# Patient Record
Sex: FEMALE | Race: WHITE | NOT HISPANIC OR LATINO | Employment: UNEMPLOYED | URBAN - METROPOLITAN AREA
[De-identification: names, ages, dates, MRNs, and addresses within clinical notes are randomized per-mention and may not be internally consistent; named-entity substitution may affect disease eponyms.]

---

## 2017-06-01 ENCOUNTER — OFFICE VISIT (OUTPATIENT)
Dept: RETAIL CLINIC | Facility: CLINIC | Age: 27
End: 2017-06-01

## 2017-06-01 DIAGNOSIS — Z13.9 SCREENING: Primary | ICD-10-CM

## 2020-02-22 ENCOUNTER — HOSPITAL ENCOUNTER (EMERGENCY)
Facility: HOSPITAL | Age: 30
Discharge: HOME | End: 2020-02-22
Attending: EMERGENCY MEDICINE | Admitting: EMERGENCY MEDICINE

## 2020-02-22 ENCOUNTER — APPOINTMENT (OUTPATIENT)
Dept: CT IMAGING | Facility: HOSPITAL | Age: 30
End: 2020-02-22

## 2020-02-22 VITALS
TEMPERATURE: 97.9 F | RESPIRATION RATE: 17 BRPM | WEIGHT: 151 LBS | HEIGHT: 68 IN | OXYGEN SATURATION: 99 % | BODY MASS INDEX: 22.88 KG/M2 | HEART RATE: 93 BPM | SYSTOLIC BLOOD PRESSURE: 113 MMHG | DIASTOLIC BLOOD PRESSURE: 72 MMHG

## 2020-02-22 DIAGNOSIS — S06.0X0A CONCUSSION WITHOUT LOSS OF CONSCIOUSNESS, INITIAL ENCOUNTER: Primary | ICD-10-CM

## 2020-02-22 DIAGNOSIS — G25.3 MYOCLONIC JERKING: ICD-10-CM

## 2020-02-22 LAB
ALBUMIN SERPL-MCNC: 4.5 G/DL (ref 3.5–5.2)
ALBUMIN/GLOB SERPL: 1.7 G/DL
ALP SERPL-CCNC: 50 U/L (ref 39–117)
ALT SERPL W P-5'-P-CCNC: 9 U/L (ref 1–33)
AMORPH URATE CRY URNS QL MICRO: ABNORMAL /HPF
ANION GAP SERPL CALCULATED.3IONS-SCNC: 9 MMOL/L (ref 5–15)
AST SERPL-CCNC: 10 U/L (ref 1–32)
B-HCG UR QL: NEGATIVE
BACTERIA UR QL AUTO: ABNORMAL /HPF
BASOPHILS # BLD AUTO: 0.05 10*3/MM3 (ref 0–0.2)
BASOPHILS NFR BLD AUTO: 0.5 % (ref 0–1.5)
BILIRUB SERPL-MCNC: 0.5 MG/DL (ref 0.2–1.2)
BILIRUB UR QL STRIP: NEGATIVE
BUN BLD-MCNC: 14 MG/DL (ref 6–20)
BUN/CREAT SERPL: 24.1 (ref 7–25)
CA-I SERPL ISE-MCNC: 1.27 MMOL/L (ref 1.12–1.32)
CALCIUM SPEC-SCNC: 9 MG/DL (ref 8.6–10.5)
CHLORIDE SERPL-SCNC: 102 MMOL/L (ref 98–107)
CK SERPL-CCNC: 52 U/L (ref 20–180)
CLARITY UR: ABNORMAL
CO2 SERPL-SCNC: 27 MMOL/L (ref 22–29)
COD CRY URNS QL: ABNORMAL /HPF
COLOR UR: YELLOW
CREAT BLD-MCNC: 0.58 MG/DL (ref 0.57–1)
DEPRECATED RDW RBC AUTO: 40.6 FL (ref 37–54)
EOSINOPHIL # BLD AUTO: 0.07 10*3/MM3 (ref 0–0.4)
EOSINOPHIL NFR BLD AUTO: 0.7 % (ref 0.3–6.2)
ERYTHROCYTE [DISTWIDTH] IN BLOOD BY AUTOMATED COUNT: 11.9 % (ref 12.3–15.4)
ERYTHROCYTE [SEDIMENTATION RATE] IN BLOOD: 5 MM/HR (ref 0–20)
GFR SERPL CREATININE-BSD FRML MDRD: 122 ML/MIN/1.73
GFR SERPL CREATININE-BSD FRML MDRD: 148 ML/MIN/1.73
GLOBULIN UR ELPH-MCNC: 2.6 GM/DL
GLUCOSE BLD-MCNC: 94 MG/DL (ref 65–99)
GLUCOSE BLDC GLUCOMTR-MCNC: 89 MG/DL (ref 70–130)
GLUCOSE UR STRIP-MCNC: NEGATIVE MG/DL
HCT VFR BLD AUTO: 43.3 % (ref 34–46.6)
HGB BLD-MCNC: 14.8 G/DL (ref 12–15.9)
HGB UR QL STRIP.AUTO: NEGATIVE
HOLD SPECIMEN: NORMAL
HOLD SPECIMEN: NORMAL
HYALINE CASTS UR QL AUTO: ABNORMAL /LPF
IMM GRANULOCYTES # BLD AUTO: 0.03 10*3/MM3 (ref 0–0.05)
IMM GRANULOCYTES NFR BLD AUTO: 0.3 % (ref 0–0.5)
INTERNAL NEGATIVE CONTROL: NEGATIVE
INTERNAL POSITIVE CONTROL: POSITIVE
KETONES UR QL STRIP: ABNORMAL
LEUKOCYTE ESTERASE UR QL STRIP.AUTO: NEGATIVE
LYMPHOCYTES # BLD AUTO: 3.24 10*3/MM3 (ref 0.7–3.1)
LYMPHOCYTES NFR BLD AUTO: 32.4 % (ref 19.6–45.3)
Lab: NORMAL
MAGNESIUM SERPL-MCNC: 2 MG/DL (ref 1.6–2.6)
MCH RBC QN AUTO: 31.9 PG (ref 26.6–33)
MCHC RBC AUTO-ENTMCNC: 34.2 G/DL (ref 31.5–35.7)
MCV RBC AUTO: 93.3 FL (ref 79–97)
MONOCYTES # BLD AUTO: 0.74 10*3/MM3 (ref 0.1–0.9)
MONOCYTES NFR BLD AUTO: 7.4 % (ref 5–12)
MUCOUS THREADS URNS QL MICRO: ABNORMAL /HPF
MYOGLOBIN SERPL-MCNC: 21 NG/ML (ref 25–58)
NEUTROPHILS # BLD AUTO: 5.86 10*3/MM3 (ref 1.7–7)
NEUTROPHILS NFR BLD AUTO: 58.7 % (ref 42.7–76)
NITRITE UR QL STRIP: NEGATIVE
NRBC BLD AUTO-RTO: 0 /100 WBC (ref 0–0.2)
PH UR STRIP.AUTO: 7.5 [PH] (ref 5–8)
PLATELET # BLD AUTO: 242 10*3/MM3 (ref 140–450)
PMV BLD AUTO: 10.3 FL (ref 6–12)
POTASSIUM BLD-SCNC: 3.7 MMOL/L (ref 3.5–5.2)
PROT SERPL-MCNC: 7.1 G/DL (ref 6–8.5)
PROT UR QL STRIP: NEGATIVE
RBC # BLD AUTO: 4.64 10*6/MM3 (ref 3.77–5.28)
RBC # UR: ABNORMAL /HPF
REF LAB TEST METHOD: ABNORMAL
SODIUM BLD-SCNC: 138 MMOL/L (ref 136–145)
SP GR UR STRIP: 1.03 (ref 1–1.03)
SQUAMOUS #/AREA URNS HPF: ABNORMAL /HPF
T4 FREE SERPL-MCNC: 1.43 NG/DL (ref 0.93–1.7)
TSH SERPL DL<=0.05 MIU/L-ACNC: 1.01 UIU/ML (ref 0.27–4.2)
UROBILINOGEN UR QL STRIP: ABNORMAL
WBC NRBC COR # BLD: 9.99 10*3/MM3 (ref 3.4–10.8)
WBC UR QL AUTO: ABNORMAL /HPF
WHOLE BLOOD HOLD SPECIMEN: NORMAL
WHOLE BLOOD HOLD SPECIMEN: NORMAL

## 2020-02-22 PROCEDURE — 81001 URINALYSIS AUTO W/SCOPE: CPT | Performed by: EMERGENCY MEDICINE

## 2020-02-22 PROCEDURE — 82962 GLUCOSE BLOOD TEST: CPT

## 2020-02-22 PROCEDURE — 99283 EMERGENCY DEPT VISIT LOW MDM: CPT

## 2020-02-22 PROCEDURE — 80050 GENERAL HEALTH PANEL: CPT | Performed by: EMERGENCY MEDICINE

## 2020-02-22 PROCEDURE — 84439 ASSAY OF FREE THYROXINE: CPT | Performed by: EMERGENCY MEDICINE

## 2020-02-22 PROCEDURE — 83735 ASSAY OF MAGNESIUM: CPT | Performed by: EMERGENCY MEDICINE

## 2020-02-22 PROCEDURE — 81025 URINE PREGNANCY TEST: CPT | Performed by: EMERGENCY MEDICINE

## 2020-02-22 PROCEDURE — 82330 ASSAY OF CALCIUM: CPT | Performed by: EMERGENCY MEDICINE

## 2020-02-22 PROCEDURE — 93005 ELECTROCARDIOGRAM TRACING: CPT | Performed by: EMERGENCY MEDICINE

## 2020-02-22 PROCEDURE — 70450 CT HEAD/BRAIN W/O DYE: CPT

## 2020-02-22 PROCEDURE — 83874 ASSAY OF MYOGLOBIN: CPT | Performed by: EMERGENCY MEDICINE

## 2020-02-22 PROCEDURE — 85652 RBC SED RATE AUTOMATED: CPT | Performed by: EMERGENCY MEDICINE

## 2020-02-22 PROCEDURE — 82550 ASSAY OF CK (CPK): CPT | Performed by: EMERGENCY MEDICINE

## 2020-02-22 RX ORDER — DICLOFENAC SODIUM 75 MG/1
75 TABLET, DELAYED RELEASE ORAL 2 TIMES DAILY
Qty: 14 TABLET | Refills: 0 | OUTPATIENT
Start: 2020-02-22 | End: 2020-03-30

## 2020-02-22 RX ORDER — SODIUM CHLORIDE 0.9 % (FLUSH) 0.9 %
10 SYRINGE (ML) INJECTION AS NEEDED
Status: DISCONTINUED | OUTPATIENT
Start: 2020-02-22 | End: 2020-02-22 | Stop reason: HOSPADM

## 2020-02-22 NOTE — ED PROVIDER NOTES
Subjective   Cooper Bah is a 29 y.o. female who presents to the ED with c/o fall. The patient reports that for the past 2-3 weeks she has been experiencing weakness in her extremities, twitching and tingling sensations in her bilateral upper extremities, and muscle spasms. This morning she awoke and stood up out of bed but had sudden dizziness and generalized weakness, causing her to fall. During her fall she hit her head on the nightstand at bedside but she did not lose consciousness. Since onset the patient has had headache and blurred vision but she denies any nausea or vomiting. Recently she has been receiving treatment from her primary care provider, Dr. Rodarte, who has been running blood work and reportedly performed an x-ray of the patient's abdomen. The patient has no pertinent past medical history on file. There are no other acute complaints at this time.      History provided by:  Patient and parent  Fall   Mechanism of injury: fall    Injury location:  Head/neck  Head/neck injury location:  Head  Incident location:  Home  Time since incident:  7 hours  Arrived directly from scene: no    Fall:     Fall occurred:  Standing    Impact surface:  Furniture    Point of impact:  Head    Entrapped after fall: no    Suspicion of alcohol use: no    Suspicion of drug use: no    Tetanus status:  Unknown  Prior to arrival data:     Bystander interventions:  None    Patient ambulatory at scene: yes      Blood loss:  None    Responsiveness at scene:  Alert    Orientation at scene:  Person, place, situation and time    Loss of consciousness: no      Amnesic to event: no      Airway condition since incident:  Stable    Breathing condition since incident:  Stable    Circulation condition since incident:  Stable  Associated symptoms: headaches    Associated symptoms: no abdominal pain, no back pain, no blindness, no chest pain, no difficulty breathing, no hearing loss, no loss of consciousness, no nausea, no neck pain, no  seizures and no vomiting    Risk factors: no CABG, no CAD, no CHF, no COPD, no dialysis and no past MI        Review of Systems   HENT: Negative for hearing loss.         The patient reports hitting her head during a fall this morning and has had a headache since then.   Eyes: Positive for visual disturbance. Negative for blindness.        The patient complains of blurry vision.   Cardiovascular: Negative for chest pain.   Gastrointestinal: Negative for abdominal pain, nausea and vomiting.   Musculoskeletal: Negative for back pain and neck pain.        The patient complains of muscle spasms.   Skin: Negative for wound.   Neurological: Positive for weakness and headaches. Negative for seizures and loss of consciousness.        She complains of weakness in her extremities as well as a tingling and twitching sensation in her bilateral upper extremities.   All other systems reviewed and are negative.      No past medical history on file.    No Known Allergies    No past surgical history on file.    No family history on file.    Social History     Socioeconomic History   • Marital status: Single     Spouse name: Not on file   • Number of children: Not on file   • Years of education: Not on file   • Highest education level: Not on file         Objective   Physical Exam   Constitutional: She is oriented to person, place, and time. She appears well-developed and well-nourished. No distress.   HENT:   Head: Normocephalic and atraumatic.   Eyes: Pupils are equal, round, and reactive to light. Conjunctivae and EOM are normal. No scleral icterus.   Neck: Normal range of motion. Neck supple.   Cardiovascular: Normal rate, regular rhythm and normal heart sounds.   No murmur heard.  Pulmonary/Chest: Effort normal and breath sounds normal. No respiratory distress.   Abdominal: Soft. There is no tenderness. There is no rebound and no guarding.   Musculoskeletal: Normal range of motion. She exhibits no edema.   Neurological: She is  alert and oriented to person, place, and time.   Speech fluent and articulate.  No facial droop.  5/5 strength in arms and legs.  Normal .  Mentation normal.     Skin: Skin is warm and dry.   Psychiatric: She has a normal mood and affect. Her behavior is normal.   Nursing note and vitals reviewed.      Procedures         ED Course          CT negative. Labs normal.  Patient stable on serial rechecks.  Discussed findings, concerns, plan of care, expected course, reasons to return and followup.  Provided the opportunity to ask questions.  Discussed head injuries in detail.                                        MDM  Number of Diagnoses or Management Options  Concussion without loss of consciousness, initial encounter:   Myoclonic jerking:      Amount and/or Complexity of Data Reviewed  Clinical lab tests: reviewed and ordered  Tests in the radiology section of CPT®: reviewed and ordered  Decide to obtain previous medical records or to obtain history from someone other than the patient: yes  Obtain history from someone other than the patient: yes  Independent visualization of images, tracings, or specimens: yes        Final diagnoses:   Concussion without loss of consciousness, initial encounter   Myoclonic jerking       Documentation assistance provided by barbara Angulo.  Information recorded by the barbara was done at my direction and has been verified and validated by me.     Danny Angulo  02/22/20 1818       Danny Angulo  02/22/20 1950       Osbaldo Sarabia MD  02/23/20 0021

## 2020-03-30 ENCOUNTER — TELEPHONE (OUTPATIENT)
Dept: GASTROENTEROLOGY | Facility: CLINIC | Age: 30
End: 2020-03-30

## 2020-03-30 ENCOUNTER — APPOINTMENT (OUTPATIENT)
Dept: CT IMAGING | Facility: HOSPITAL | Age: 30
End: 2020-03-30

## 2020-03-30 ENCOUNTER — HOSPITAL ENCOUNTER (EMERGENCY)
Facility: HOSPITAL | Age: 30
Discharge: HOME | End: 2020-03-30
Attending: EMERGENCY MEDICINE | Admitting: EMERGENCY MEDICINE

## 2020-03-30 VITALS
HEIGHT: 67 IN | BODY MASS INDEX: 23.07 KG/M2 | OXYGEN SATURATION: 98 % | HEART RATE: 89 BPM | TEMPERATURE: 99.2 F | DIASTOLIC BLOOD PRESSURE: 70 MMHG | SYSTOLIC BLOOD PRESSURE: 93 MMHG | WEIGHT: 147 LBS | RESPIRATION RATE: 18 BRPM

## 2020-03-30 DIAGNOSIS — R10.84 DIFFUSE ABDOMINAL PAIN: Primary | ICD-10-CM

## 2020-03-30 DIAGNOSIS — K92.0 HEMATEMESIS WITH NAUSEA: ICD-10-CM

## 2020-03-30 DIAGNOSIS — R16.0 LIVER MASS: ICD-10-CM

## 2020-03-30 LAB
ALBUMIN SERPL-MCNC: 4.6 G/DL (ref 3.5–5.2)
ALBUMIN/GLOB SERPL: 1.8 G/DL
ALP SERPL-CCNC: 49 U/L (ref 39–117)
ALT SERPL W P-5'-P-CCNC: 8 U/L (ref 1–33)
ANION GAP SERPL CALCULATED.3IONS-SCNC: 13 MMOL/L (ref 5–15)
AST SERPL-CCNC: 13 U/L (ref 1–32)
B-HCG UR QL: NEGATIVE
BACTERIA UR QL AUTO: ABNORMAL /HPF
BASOPHILS # BLD AUTO: 0.04 10*3/MM3 (ref 0–0.2)
BASOPHILS NFR BLD AUTO: 0.3 % (ref 0–1.5)
BILIRUB SERPL-MCNC: 0.9 MG/DL (ref 0.2–1.2)
BILIRUB UR QL STRIP: NEGATIVE
BUN BLD-MCNC: 12 MG/DL (ref 6–20)
BUN/CREAT SERPL: 15 (ref 7–25)
CALCIUM SPEC-SCNC: 9.5 MG/DL (ref 8.6–10.5)
CHLORIDE SERPL-SCNC: 104 MMOL/L (ref 98–107)
CLARITY UR: CLEAR
CO2 SERPL-SCNC: 24 MMOL/L (ref 22–29)
COLOR UR: YELLOW
CREAT BLD-MCNC: 0.8 MG/DL (ref 0.57–1)
DEPRECATED RDW RBC AUTO: 41.1 FL (ref 37–54)
EOSINOPHIL # BLD AUTO: 0.08 10*3/MM3 (ref 0–0.4)
EOSINOPHIL NFR BLD AUTO: 0.7 % (ref 0.3–6.2)
ERYTHROCYTE [DISTWIDTH] IN BLOOD BY AUTOMATED COUNT: 11.9 % (ref 12.3–15.4)
GFR SERPL CREATININE-BSD FRML MDRD: 102 ML/MIN/1.73
GFR SERPL CREATININE-BSD FRML MDRD: 84 ML/MIN/1.73
GLOBULIN UR ELPH-MCNC: 2.6 GM/DL
GLUCOSE BLD-MCNC: 113 MG/DL (ref 65–99)
GLUCOSE UR STRIP-MCNC: NEGATIVE MG/DL
HCT VFR BLD AUTO: 44.7 % (ref 34–46.6)
HGB BLD-MCNC: 15 G/DL (ref 12–15.9)
HGB UR QL STRIP.AUTO: NEGATIVE
HOLD SPECIMEN: NORMAL
HOLD SPECIMEN: NORMAL
HYALINE CASTS UR QL AUTO: ABNORMAL /LPF
IMM GRANULOCYTES # BLD AUTO: 0.03 10*3/MM3 (ref 0–0.05)
IMM GRANULOCYTES NFR BLD AUTO: 0.3 % (ref 0–0.5)
KETONES UR QL STRIP: ABNORMAL
LEUKOCYTE ESTERASE UR QL STRIP.AUTO: ABNORMAL
LIPASE SERPL-CCNC: 17 U/L (ref 13–60)
LYMPHOCYTES # BLD AUTO: 3.44 10*3/MM3 (ref 0.7–3.1)
LYMPHOCYTES NFR BLD AUTO: 30 % (ref 19.6–45.3)
MAGNESIUM SERPL-MCNC: 2.1 MG/DL (ref 1.6–2.6)
MCH RBC QN AUTO: 31 PG (ref 26.6–33)
MCHC RBC AUTO-ENTMCNC: 33.6 G/DL (ref 31.5–35.7)
MCV RBC AUTO: 92.4 FL (ref 79–97)
MONOCYTES # BLD AUTO: 0.82 10*3/MM3 (ref 0.1–0.9)
MONOCYTES NFR BLD AUTO: 7.1 % (ref 5–12)
MUCOUS THREADS URNS QL MICRO: ABNORMAL /HPF
NEUTROPHILS # BLD AUTO: 7.07 10*3/MM3 (ref 1.7–7)
NEUTROPHILS NFR BLD AUTO: 61.6 % (ref 42.7–76)
NITRITE UR QL STRIP: NEGATIVE
NRBC BLD AUTO-RTO: 0 /100 WBC (ref 0–0.2)
PH UR STRIP.AUTO: <=5 [PH] (ref 5–8)
PLATELET # BLD AUTO: 221 10*3/MM3 (ref 140–450)
PMV BLD AUTO: 10.5 FL (ref 6–12)
POTASSIUM BLD-SCNC: 3.7 MMOL/L (ref 3.5–5.2)
PROT SERPL-MCNC: 7.2 G/DL (ref 6–8.5)
PROT UR QL STRIP: NEGATIVE
RBC # BLD AUTO: 4.84 10*6/MM3 (ref 3.77–5.28)
RBC # UR: ABNORMAL /HPF
REF LAB TEST METHOD: ABNORMAL
SODIUM BLD-SCNC: 141 MMOL/L (ref 136–145)
SP GR UR STRIP: 1.02 (ref 1–1.03)
SQUAMOUS #/AREA URNS HPF: ABNORMAL /HPF
TSH SERPL DL<=0.05 MIU/L-ACNC: 1.5 UIU/ML (ref 0.27–4.2)
UROBILINOGEN UR QL STRIP: ABNORMAL
WBC NRBC COR # BLD: 11.48 10*3/MM3 (ref 3.4–10.8)
WBC UR QL AUTO: ABNORMAL /HPF
WHOLE BLOOD HOLD SPECIMEN: NORMAL
WHOLE BLOOD HOLD SPECIMEN: NORMAL

## 2020-03-30 PROCEDURE — 83735 ASSAY OF MAGNESIUM: CPT | Performed by: PHYSICIAN ASSISTANT

## 2020-03-30 PROCEDURE — 25010000002 IOPAMIDOL 61 % SOLUTION: Performed by: EMERGENCY MEDICINE

## 2020-03-30 PROCEDURE — 99283 EMERGENCY DEPT VISIT LOW MDM: CPT

## 2020-03-30 PROCEDURE — 25010000002 ONDANSETRON PER 1 MG: Performed by: PHYSICIAN ASSISTANT

## 2020-03-30 PROCEDURE — 71260 CT THORAX DX C+: CPT

## 2020-03-30 PROCEDURE — 80050 GENERAL HEALTH PANEL: CPT | Performed by: PHYSICIAN ASSISTANT

## 2020-03-30 PROCEDURE — 74177 CT ABD & PELVIS W/CONTRAST: CPT

## 2020-03-30 PROCEDURE — 82105 ALPHA-FETOPROTEIN SERUM: CPT | Performed by: INTERNAL MEDICINE

## 2020-03-30 PROCEDURE — 81001 URINALYSIS AUTO W/SCOPE: CPT | Performed by: EMERGENCY MEDICINE

## 2020-03-30 PROCEDURE — 81025 URINE PREGNANCY TEST: CPT | Performed by: EMERGENCY MEDICINE

## 2020-03-30 PROCEDURE — 83690 ASSAY OF LIPASE: CPT | Performed by: PHYSICIAN ASSISTANT

## 2020-03-30 PROCEDURE — 96374 THER/PROPH/DIAG INJ IV PUSH: CPT

## 2020-03-30 PROCEDURE — 36415 COLL VENOUS BLD VENIPUNCTURE: CPT | Performed by: INTERNAL MEDICINE

## 2020-03-30 RX ORDER — ONDANSETRON 2 MG/ML
4 INJECTION INTRAMUSCULAR; INTRAVENOUS ONCE
Status: COMPLETED | OUTPATIENT
Start: 2020-03-30 | End: 2020-03-30

## 2020-03-30 RX ORDER — LORAZEPAM 0.5 MG/1
0.5 TABLET ORAL EVERY 8 HOURS PRN
COMMUNITY
End: 2020-09-14

## 2020-03-30 RX ORDER — MULTIVITAMIN WITH IRON
TABLET ORAL
COMMUNITY
End: 2021-05-19

## 2020-03-30 RX ORDER — OMEPRAZOLE 20 MG/1
20 CAPSULE, DELAYED RELEASE ORAL DAILY
COMMUNITY
End: 2020-05-14

## 2020-03-30 RX ORDER — ONDANSETRON 4 MG/1
4 TABLET, ORALLY DISINTEGRATING ORAL EVERY 6 HOURS PRN
Qty: 12 TABLET | Refills: 0 | Status: SHIPPED | OUTPATIENT
Start: 2020-03-30 | End: 2020-05-14

## 2020-03-30 RX ORDER — SODIUM CHLORIDE 0.9 % (FLUSH) 0.9 %
10 SYRINGE (ML) INJECTION AS NEEDED
Status: DISCONTINUED | OUTPATIENT
Start: 2020-03-30 | End: 2020-03-31 | Stop reason: HOSPADM

## 2020-03-30 RX ORDER — ERGOCALCIFEROL 1.25 MG/1
50000 CAPSULE ORAL WEEKLY
COMMUNITY
End: 2020-09-14

## 2020-03-30 RX ADMIN — IOPAMIDOL 95 ML: 612 INJECTION, SOLUTION INTRAVENOUS at 20:56

## 2020-03-30 RX ADMIN — SODIUM CHLORIDE 1000 ML: 9 INJECTION, SOLUTION INTRAVENOUS at 19:24

## 2020-03-30 RX ADMIN — SODIUM CHLORIDE 1000 ML: 9 INJECTION, SOLUTION INTRAVENOUS at 21:31

## 2020-03-30 RX ADMIN — ONDANSETRON 4 MG: 2 INJECTION INTRAMUSCULAR; INTRAVENOUS at 19:24

## 2020-03-31 ENCOUNTER — PREP FOR SURGERY (OUTPATIENT)
Dept: OTHER | Facility: HOSPITAL | Age: 30
End: 2020-03-31

## 2020-03-31 DIAGNOSIS — R16.0 LIVER MASS: ICD-10-CM

## 2020-03-31 DIAGNOSIS — R10.84 GENERALIZED ABDOMINAL PAIN: ICD-10-CM

## 2020-03-31 DIAGNOSIS — R10.9 ABDOMINAL PAIN, UNSPECIFIED ABDOMINAL LOCATION: Primary | ICD-10-CM

## 2020-03-31 DIAGNOSIS — R63.4 WEIGHT LOSS: ICD-10-CM

## 2020-03-31 DIAGNOSIS — K92.0 HEMATEMESIS WITH NAUSEA: Primary | ICD-10-CM

## 2020-03-31 LAB — ALPHA-FETOPROTEIN: 2.3 NG/ML (ref 0–8.3)

## 2020-04-01 ENCOUNTER — HOSPITAL ENCOUNTER (OUTPATIENT)
Facility: HOSPITAL | Age: 30
Setting detail: HOSPITAL OUTPATIENT SURGERY
Discharge: HOME | End: 2020-04-01
Attending: INTERNAL MEDICINE | Admitting: INTERNAL MEDICINE

## 2020-04-01 ENCOUNTER — ANESTHESIA EVENT (OUTPATIENT)
Dept: GASTROENTEROLOGY | Facility: HOSPITAL | Age: 30
End: 2020-04-01

## 2020-04-01 ENCOUNTER — ANESTHESIA (OUTPATIENT)
Dept: GASTROENTEROLOGY | Facility: HOSPITAL | Age: 30
End: 2020-04-01

## 2020-04-01 VITALS
TEMPERATURE: 97.5 F | DIASTOLIC BLOOD PRESSURE: 72 MMHG | OXYGEN SATURATION: 97 % | RESPIRATION RATE: 16 BRPM | SYSTOLIC BLOOD PRESSURE: 101 MMHG | HEART RATE: 76 BPM

## 2020-04-01 DIAGNOSIS — R63.4 WEIGHT LOSS: ICD-10-CM

## 2020-04-01 DIAGNOSIS — K92.0 HEMATEMESIS WITH NAUSEA: ICD-10-CM

## 2020-04-01 DIAGNOSIS — R10.84 GENERALIZED ABDOMINAL PAIN: ICD-10-CM

## 2020-04-01 LAB
B-HCG UR QL: NEGATIVE
INTERNAL NEGATIVE CONTROL: NEGATIVE
INTERNAL POSITIVE CONTROL: POSITIVE
Lab: NORMAL

## 2020-04-01 PROCEDURE — 88305 TISSUE EXAM BY PATHOLOGIST: CPT | Performed by: INTERNAL MEDICINE

## 2020-04-01 PROCEDURE — C1726 CATH, BAL DIL, NON-VASCULAR: HCPCS | Performed by: INTERNAL MEDICINE

## 2020-04-01 PROCEDURE — 25010000003 LIDOCAINE 1 % SOLUTION: Performed by: NURSE ANESTHETIST, CERTIFIED REGISTERED

## 2020-04-01 PROCEDURE — 43239 EGD BIOPSY SINGLE/MULTIPLE: CPT | Performed by: INTERNAL MEDICINE

## 2020-04-01 PROCEDURE — 43249 ESOPH EGD DILATION <30 MM: CPT | Performed by: INTERNAL MEDICINE

## 2020-04-01 PROCEDURE — 81025 URINE PREGNANCY TEST: CPT | Performed by: ANESTHESIOLOGY

## 2020-04-01 PROCEDURE — 25010000002 PROPOFOL 10 MG/ML EMULSION: Performed by: NURSE ANESTHETIST, CERTIFIED REGISTERED

## 2020-04-01 RX ORDER — FENTANYL CITRATE 50 UG/ML
50 INJECTION, SOLUTION INTRAMUSCULAR; INTRAVENOUS
Status: DISCONTINUED | OUTPATIENT
Start: 2020-04-01 | End: 2020-04-02 | Stop reason: HOSPADM

## 2020-04-01 RX ORDER — PROMETHAZINE HYDROCHLORIDE 25 MG/ML
12.5 INJECTION, SOLUTION INTRAMUSCULAR; INTRAVENOUS ONCE AS NEEDED
Status: DISCONTINUED | OUTPATIENT
Start: 2020-04-01 | End: 2020-04-02 | Stop reason: HOSPADM

## 2020-04-01 RX ORDER — FAMOTIDINE 10 MG/ML
20 INJECTION, SOLUTION INTRAVENOUS ONCE
Status: COMPLETED | OUTPATIENT
Start: 2020-04-01 | End: 2020-04-01

## 2020-04-01 RX ORDER — LIDOCAINE HYDROCHLORIDE 10 MG/ML
INJECTION, SOLUTION INFILTRATION; PERINEURAL AS NEEDED
Status: DISCONTINUED | OUTPATIENT
Start: 2020-04-01 | End: 2020-04-01 | Stop reason: SURG

## 2020-04-01 RX ORDER — PROPOFOL 10 MG/ML
VIAL (ML) INTRAVENOUS CONTINUOUS PRN
Status: DISCONTINUED | OUTPATIENT
Start: 2020-04-01 | End: 2020-04-01 | Stop reason: SURG

## 2020-04-01 RX ORDER — SODIUM CHLORIDE, SODIUM LACTATE, POTASSIUM CHLORIDE, CALCIUM CHLORIDE 600; 310; 30; 20 MG/100ML; MG/100ML; MG/100ML; MG/100ML
9 INJECTION, SOLUTION INTRAVENOUS CONTINUOUS
Status: DISCONTINUED | OUTPATIENT
Start: 2020-04-01 | End: 2020-04-02 | Stop reason: HOSPADM

## 2020-04-01 RX ORDER — SODIUM CHLORIDE, SODIUM LACTATE, POTASSIUM CHLORIDE, CALCIUM CHLORIDE 600; 310; 30; 20 MG/100ML; MG/100ML; MG/100ML; MG/100ML
INJECTION, SOLUTION INTRAVENOUS CONTINUOUS PRN
Status: DISCONTINUED | OUTPATIENT
Start: 2020-04-01 | End: 2020-04-01 | Stop reason: SURG

## 2020-04-01 RX ORDER — PROMETHAZINE HYDROCHLORIDE 25 MG/1
25 SUPPOSITORY RECTAL ONCE AS NEEDED
Status: DISCONTINUED | OUTPATIENT
Start: 2020-04-01 | End: 2020-04-02 | Stop reason: HOSPADM

## 2020-04-01 RX ORDER — AMITRIPTYLINE HYDROCHLORIDE 25 MG/1
25 TABLET, FILM COATED ORAL NIGHTLY
Qty: 60 TABLET | Refills: 3 | Status: SHIPPED | OUTPATIENT
Start: 2020-04-01 | End: 2020-09-14

## 2020-04-01 RX ORDER — ONDANSETRON 2 MG/ML
4 INJECTION INTRAMUSCULAR; INTRAVENOUS ONCE AS NEEDED
Status: DISCONTINUED | OUTPATIENT
Start: 2020-04-01 | End: 2020-04-02 | Stop reason: HOSPADM

## 2020-04-01 RX ORDER — PROMETHAZINE HYDROCHLORIDE 25 MG/1
25 TABLET ORAL ONCE AS NEEDED
Status: DISCONTINUED | OUTPATIENT
Start: 2020-04-01 | End: 2020-04-02 | Stop reason: HOSPADM

## 2020-04-01 RX ADMIN — PROPOFOL 100 MCG/KG/MIN: 10 INJECTION, EMULSION INTRAVENOUS at 12:57

## 2020-04-01 RX ADMIN — LIDOCAINE HYDROCHLORIDE 100 MG: 10 INJECTION, SOLUTION INFILTRATION; PERINEURAL at 12:57

## 2020-04-01 RX ADMIN — SODIUM CHLORIDE, POTASSIUM CHLORIDE, SODIUM LACTATE AND CALCIUM CHLORIDE: 600; 310; 30; 20 INJECTION, SOLUTION INTRAVENOUS at 12:57

## 2020-04-01 RX ADMIN — SODIUM CHLORIDE, POTASSIUM CHLORIDE, SODIUM LACTATE AND CALCIUM CHLORIDE 9 ML/HR: 600; 310; 30; 20 INJECTION, SOLUTION INTRAVENOUS at 12:14

## 2020-04-01 RX ADMIN — FAMOTIDINE 20 MG: 10 INJECTION INTRAVENOUS at 12:13

## 2020-04-01 NOTE — H&P
GASTROENTEROLOGY OFFICE NOTE  Cooper Bah  [id number]  1990    CARE TEAM  Patient Care Team:  Lyndon Rodarte MD as PCP - General (Family Medicine)    No ref. provider found     Chief complaint  Abdominal pain  Abnormal weight loss  Hematemesis    HISTORY OF PRESENT ILLNESS:  First visit for this 29-year-old white female who presents with chief complaint of abdominal pain, abnormal weight loss and hematemesis.    Patient recently seen in the emergency room on 2020.  She presents with about 1 month of abdominal pain associated with loss of appetite and a 36 pound weight loss since January.  When she tries to eat she has had some episodes of emesis and has noticed some bright red blood in her emesis.  Also noted is constipation characterized by infrequent bowel movements.  Melanotic stools and hematochezia has not been noted..  She describes her pain as being localized in the epigastrium and nonradiating.  It is moderate in intensity and of gradual onset.  It is been worsening since its onset and she cannot identify any particular factors that make it better or worse and it seems to be unrelated time of day, activities food intake or bowel habits.. Patient states that the epigastric pain has been present on a daily basis and sometimes it never really entirely goes away.  It is moderate in intensity and is nonradiating.  She is been on omeprazole 40 mg p.o. twice daily for at least the last few weeks without resolution of her symptoms.    CAT scan on  in [facility] revealed a 2.9 mm left hepatic lobe mass.  Subsequent alpha-fetoprotein is been within normal limits.    She has been prescribed omeprazole which has failed to resolve her complaints.  She is 1 pack/day smoker cigarettes.    PAST MEDICAL HISTORY  No past medical history on file.     PAST SURGICAL HISTORY  Past Surgical History:   Procedure Laterality Date   •  SECTION          MEDICATIONS:  No current  facility-administered medications for this encounter.     Current Outpatient Medications:   •  LORazepam (ATIVAN) 0.5 MG tablet, Take 0.5 mg by mouth Every 8 (Eight) Hours As Needed for Anxiety., Disp: , Rfl:   •  Magnesium 250 MG tablet, Take  by mouth., Disp: , Rfl:   •  omeprazole (priLOSEC) 20 MG capsule, Take 20 mg by mouth Daily., Disp: , Rfl:   •  ondansetron ODT (ZOFRAN-ODT) 4 MG disintegrating tablet, Place 1 tablet on the tongue Every 6 (Six) Hours As Needed for Nausea., Disp: 12 tablet, Rfl: 0  •  vitamin D (ERGOCALCIFEROL) 1.25 MG (64852 UT) capsule capsule, Take 50,000 Units by mouth 1 (One) Time Per Week., Disp: , Rfl:     ALLERGIES  No Known Allergies    FAMILY HISTORY:  No family history on file.  Patient reports history of mother with colon cancer in her late 20s.  Patient's last colonoscopy was in 2012.    SOCIAL HISTORY  Social History     Socioeconomic History   • Marital status: Single     Spouse name: Not on file   • Number of children: Not on file   • Years of education: Not on file   • Highest education level: Not on file   Tobacco Use   • Smoking status: Current Every Day Smoker     Packs/day: 1.00     Types: Cigarettes   Substance and Sexual Activity   • Alcohol use: Not Currently   • Drug use: Not Currently   • Sexual activity: Defer     Socioeconomic History:  Single.  1 pack/day smoker cigarettes does not abuse alcohol.  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  She currently lives with her parents.       REVIEW OF SYSTEMS  Review of Systems   Constitutional: Positive for appetite change and unexpected weight loss.   Gastrointestinal: Positive for abdominal pain, anal bleeding, blood in stool, constipation, vomiting and indigestion. Negative for abdominal distention and diarrhea.     I reviewed the above-noted review of systems.    PHYSICAL EXAM   LMP 03/06/2020   General: Alert and oriented x 3. In no apparent or acute  distress.  and No stigmata of chronic liver disease  HEENT: Anicteric slcera. Normal oropharynx  Neck: Supple. Without lymphadenopathy  CV: Regular rate and rhythm, S1, S2  Lungs: Clear to ausculation. Without rales, robchi and wheezing  Abdomen: Multiple trigger points are noted in the upper abdomen that reproduces her presenting complaint of abdominal pain with very light palpation.  Bowel sounds are present and normoactive without palpable masses hepatosplenomegaly  Extremeties: without clubbing, cyanosis or edema  Neurologic:  Alert and oriented x 3 without focal motor or sensory deficits  Rectal exam: deferred     Results for orders placed or performed in visit on 03/31/20   AFP Tumor Marker   Result Value Ref Range    ALPHA-FETOPROTEIN 2.30 0 - 8.3 ng/mL        Results Review:  I reviewed the patient's new clinical results.      ASSESSMENT  1.-  Patient reports complaints of dysphagia, odynophagia, hematemesis and epigastric abdominal pain with abnormal weight loss.  Symptom complex certainly concerning for the potential for malignancy and given her recent CAT scan revealing a small hepatic nodule exclusion of upper GI neoplasia is appropriate and upper endoscopy is deemed nonelective at this time.  EGD is offered and she is agreeable to proceeding.  She understands risk of esophageal perforation, bleeding, cardiorespiratory compromise missed lesions and is agreeable to proceeding  2.-  Abnormal weight loss.  See above.  3.-  Myofascial abdominal pain  4.-  Diminutive hepatic nodule of 2.9 mm in left lobe.  Likely FNH/adenoma or hemangioma.  MRI to better characterize is recommended.  This is deemed nonurgent at this time and she is willing to wait  5.-  Family history of colon cancer.  Mother with colon cancer in her 20s    PLAN  1.-  Proceed with EGD and likely esophageal dilation and biopsies as necessary to rule out celiac disease H. pylori and eosinophilic esophagitis  2.-  Trial of Elavil 25 to 50 mg  p.o. nightly titrated to response and tolerance.  Minimum 4 to 6-week course.  Consider referral to pain clinic for trigger point injection for refractory symptoms  3.-  Patient is 8 years out from her last colonoscopy and no family history of mother with history of colon cancer at a very young age suggestive of Agudelo syndrome.  Colonoscopy is recommended but is considered for elective at this time and can wait until the current corona virus pandemic deferral of elective procedure passes.  4.-  MRI liver to better characterize her hepatic lesion.  Alpha-fetoprotein is within normal limits      I discussed the patients findings and my recommendations with patient    Cm Davi Echeverria MD  4/1/2020   10:27    Much of this note is an electronic transcription of spoken language to printed text. Electronic transcription of spoken language may permit erroneous, nonsensical word phrases to be inadvertently transcribed.  Although I have reviewed the note for these errors, some may still be present.

## 2020-04-01 NOTE — ANESTHESIA POSTPROCEDURE EVALUATION
Patient: Cooper Bah    Procedure Summary     Date:  04/01/20 Room / Location:   [facility]    Anesthesia Start:  1252 Anesthesia Stop:      Procedure:  ESOPHAGOGASTRODUODENOSCOPY (N/A ) Diagnosis:       Hematemesis with nausea      Weight loss      Generalized abdominal pain      (Hematemesis with nausea [K92.0])      (Weight loss [R63.4])      (Generalized abdominal pain [R10.84])    Surgeon:  Cm Echeverria MD Provider:  Chuck Gonzalez MD    Anesthesia Type:  general ASA Status:  2          Anesthesia Type: general    Vitals  No vitals data found for the desired time range.          Post Anesthesia Care and Evaluation    Patient location during evaluation: PACU  Patient participation: complete - patient cannot participate  Post-procedure mental status: asleep.  Pain management: adequate  Airway patency: patent  Anesthetic complications: No anesthetic complications  PONV Status: none  Cardiovascular status: acceptable  Respiratory status: acceptable  Hydration status: acceptable

## 2020-04-01 NOTE — OP NOTE
Summary EGD report.  See complete EGD report for details    Esophagus, stomach and duodenum were grossly unremarkable and biopsied for exclusion of eosinophilic esophagitis, H. pylori gastritis and celiac disease.  Empirical esophageal dilation was performed to 20 mm.    Impression  Unremarkable upper endoscopy  Patient presenting complaint is suspected to be due to myofascial abdominal pain and she has been prescribed amitriptyline 25 to 50 mg p.o. nightly to help with this    Plan  Elavil  Return appointment  Await pathology report

## 2020-04-01 NOTE — ANESTHESIA PREPROCEDURE EVALUATION
Anesthesia Evaluation     Patient summary reviewed and Nursing notes reviewed   NPO Solid Status: > 8 hours  NPO Liquid Status: > 8 hours           Airway   Mallampati: I  TM distance: >3 FB  Neck ROM: full  No difficulty expected  Dental      Pulmonary    (+) a smoker Current,   (-) COPD, asthma, shortness of breath, recent URI, no home oxygen  Cardiovascular     ECG reviewed    (-) hypertension, past MI, dysrhythmias, angina    ROS comment: Normal sinus rhythm  Cannot rule out Anterior infarct     Neuro/Psych  (+) psychiatric history (lorazepam ),     (-) seizures, CVA  GI/Hepatic/Renal/Endo    (+)  GI bleeding , liver disease (liver mass ),   (-) no renal disease, diabetes, no thyroid disorder    ROS Comment: Nausea hematemesis  Liver mass     Musculoskeletal     Abdominal    Substance History      OB/GYN          Other        ROS/Med Hx Other: HCT 44  Constant Nausea and abdo pain   CT shows no gut distention                 Anesthesia Plan    ASA 2     general   Rapid sequence(RSI CP  ETT (DIscuss with Jacki))  intravenous induction     Anesthetic plan, all risks, benefits, and alternatives have been provided, discussed and informed consent has been obtained with: patient.    Plan discussed with CRNA.

## 2020-04-02 ENCOUNTER — TELEPHONE (OUTPATIENT)
Dept: GASTROENTEROLOGY | Facility: CLINIC | Age: 30
End: 2020-04-02

## 2020-04-02 DIAGNOSIS — R10.9 ABDOMINAL PAIN, UNSPECIFIED ABDOMINAL LOCATION: ICD-10-CM

## 2020-04-02 DIAGNOSIS — R16.0 LIVER MASS: Primary | ICD-10-CM

## 2020-04-02 DIAGNOSIS — R63.4 WEIGHT LOSS: ICD-10-CM

## 2020-04-02 LAB
CYTO UR: NORMAL
LAB AP CASE REPORT: NORMAL
LAB AP CLINICAL INFORMATION: NORMAL
PATH REPORT.FINAL DX SPEC: NORMAL
PATH REPORT.GROSS SPEC: NORMAL

## 2020-04-02 NOTE — TELEPHONE ENCOUNTER
Called patient because she had wanted  to review a CT ordered by  on 03-. Notified patient  reviewed the CT but for further evaluation of her ovaries the patient should follow up with her PCP (The ordering physician  ) or her OBGYN. At this time though we are going to order a MRCP of the abdomen to evaluate the Hepatic Mass. Patient confirmed she understood and will follow up with her PCP.

## 2020-04-29 ENCOUNTER — HOSPITAL ENCOUNTER (OUTPATIENT)
Dept: MRI IMAGING | Facility: HOSPITAL | Age: 30
Discharge: HOME | End: 2020-04-29
Admitting: INTERNAL MEDICINE

## 2020-04-29 DIAGNOSIS — R16.0 LIVER MASS: ICD-10-CM

## 2020-04-29 DIAGNOSIS — R10.9 ABDOMINAL PAIN, UNSPECIFIED ABDOMINAL LOCATION: ICD-10-CM

## 2020-04-29 DIAGNOSIS — R63.4 WEIGHT LOSS: ICD-10-CM

## 2020-04-29 PROCEDURE — A9577 INJ MULTIHANCE: HCPCS | Performed by: INTERNAL MEDICINE

## 2020-04-29 PROCEDURE — 74183 MRI ABD W/O CNTR FLWD CNTR: CPT

## 2020-04-29 PROCEDURE — 0 GADOBENATE DIMEGLUMINE 529 MG/ML SOLUTION: Performed by: INTERNAL MEDICINE

## 2020-04-29 RX ADMIN — GADOBENATE DIMEGLUMINE 15 ML: 529 INJECTION, SOLUTION INTRAVENOUS at 11:16

## 2020-05-13 ENCOUNTER — TELEPHONE (OUTPATIENT)
Dept: GASTROENTEROLOGY | Facility: CLINIC | Age: 30
End: 2020-05-13

## 2020-05-14 ENCOUNTER — OFFICE VISIT (OUTPATIENT)
Dept: GASTROENTEROLOGY | Facility: CLINIC | Age: 30
End: 2020-05-14

## 2020-05-14 DIAGNOSIS — R10.84 GENERALIZED ABDOMINAL PAIN: ICD-10-CM

## 2020-05-14 DIAGNOSIS — R10.9 TRIGGER POINT OF ABDOMEN: ICD-10-CM

## 2020-05-14 DIAGNOSIS — R63.4 WEIGHT LOSS: ICD-10-CM

## 2020-05-14 DIAGNOSIS — R10.9 ABDOMINAL PAIN, UNSPECIFIED ABDOMINAL LOCATION: ICD-10-CM

## 2020-05-14 DIAGNOSIS — R16.0 LIVER MASS: Primary | ICD-10-CM

## 2020-05-14 PROCEDURE — 99213 OFFICE O/P EST LOW 20 MIN: CPT | Performed by: INTERNAL MEDICINE

## 2020-05-14 RX ORDER — METOCLOPRAMIDE HYDROCHLORIDE 10 MG/1
TABLET, ORALLY DISINTEGRATING ORAL
Qty: 90 TABLET | Refills: 2 | Status: SHIPPED | OUTPATIENT
Start: 2020-05-14 | End: 2020-05-14

## 2020-05-14 NOTE — PROGRESS NOTES
GASTROENTEROLOGY TELEMEDICINE OFFICE NOTE  Cooper Bah  [id number]  1990    CARE TEAM  Patient Care Team:  Marquise Garcia,  as PCP - General (Family Medicine)     You have chosen to receive care through a telehealth visit.  Do you consent to use a video/audio connection for your medical care today? Yes      No ref. provider found     Chief Complaint   Patient presents with   • Follow-up     Post EGD and MRI of the liver    • Abdominal Pain   • Difficulty Swallowing        HISTORY OF PRESENT ILLNESS:  Patient presents for follow-up post EGD.  She initially presented with epigastric pain, dysphagia to solids, abnormal weight loss, and abnormal imaging study.    Upper endoscopy was unremarkable and specifically negative for eosinophilic esophagitis, H. pylori gastritis or celiac disease and it was felt that her presenting complaint of pain was myofascial in origin.  She was started on Elavil 25 mg p.o. nightly and she states that this pain is gone from being a severe pain that would last for hours at a time and would occur most days to pain that is at most mild, does not occur most days and lasts at most for minutes at a time.    Her dysphagia to solids following her esophageal dilation has improved although she continues to have a lump-like sensation at the sternal notch and tells me that she is being worked up for potential thyroid disease by her primary care physician.    CAT scan revealed a left hepatic lobe 2.9 cm lesion consistent with a focal nodular hyperplasia versus adenoma.  Her alpha-fetoprotein was well within normal limits at 2.3 ng/mL with a high normal being 8.3 ng/mL.    For the most part she feels she is doing well GI wise and although the review of systems noted below indicate various GI issues use are, at this time resolved and are reference to recent symptoms that are currently not ongoing.    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         MEDICATIONS:    Current Outpatient Medications:   •  amitriptyline (Elavil) 25 MG tablet, Take 1 tablet by mouth Every Night. Take one tablet orally at bedtime for 7 days. Then take 2 tablets orally at bedtime thereafter., Disp: 60 tablet, Rfl: 3  •  LORazepam (ATIVAN) 0.5 MG tablet, Take 0.5 mg by mouth Every 8 (Eight) Hours As Needed for Anxiety., Disp: , Rfl:   •  Magnesium 250 MG tablet, Take  by mouth., Disp: , Rfl:   •  omeprazole (priLOSEC) 20 MG capsule, Take 20 mg by mouth Daily., Disp: , Rfl:   •  ondansetron ODT (ZOFRAN-ODT) 4 MG disintegrating tablet, Place 1 tablet on the tongue Every 6 (Six) Hours As Needed for Nausea., Disp: 12 tablet, Rfl: 0  •  vitamin D (ERGOCALCIFEROL) 1.25 MG (21320 UT) capsule capsule, Take 50,000 Units by mouth 1 (One) Time Per Week., Disp: , Rfl:     ALLERGIES  No Known Allergies    FAMILY HISTORY:  Family History   Problem Relation Age of Onset   • Arthritis Mother    • Asthma Mother    • Cancer Mother    • COPD Mother    • Depression Mother    • Diabetes Mother    • Hyperlipidemia Mother    • Hypertension Mother    • Mental illness Mother    • Miscarriages / Stillbirths Mother    • Colon cancer Mother    • Arthritis Father    • Cancer Father    • Hyperlipidemia Father    • Arthritis Brother    • Asthma Brother    • Diabetes Brother    • Cancer Maternal Aunt    • Early death Maternal Aunt    • Cancer Maternal Grandmother    • COPD  Maternal Grandmother    • Early death Maternal Grandfather    • Heart disease Maternal Grandfather    • Hyperlipidemia Maternal Grandfather    • Hypertension Maternal Grandfather        SOCIAL HISTORY  Social History     Socioeconomic History   • Marital status: Single     Spouse name: Not on file   • Number of children: Not on file   • Years of education: Not on file   • Highest education level: Not on file   Tobacco Use   • Smoking status: Current Every Day Smoker     Packs/day: 1.00     Types: Cigarettes   • Smokeless tobacco: Never Used   Substance and Sexual Activity   • Alcohol use: Not Currently   • Drug use: Not Currently   • Sexual activity: Defer     Socioeconomic History:  She is single.  1 pack/day smoker.  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  Lives with her parents.       REVIEW OF SYSTEMS  Review of Systems   Constitutional: Positive for activity change, appetite change, diaphoresis, fatigue and fever. Negative for chills, unexpected weight gain and unexpected weight loss.        Last couple months the patient has had a low grade fever and yesterday where her temperature was 99.7. Patient followed up with her Primary care physician Marquise Garcia for this     HENT: Positive for congestion, sneezing and trouble swallowing. Negative for dental problem, drooling, ear discharge, ear pain, facial swelling, hearing loss, mouth sores, nosebleeds, postnasal drip, rhinorrhea, sinus pressure, sore throat, swollen glands, tinnitus and voice change.    Respiratory: Negative for apnea, cough, choking, chest tightness, shortness of breath, wheezing and stridor.    Cardiovascular: Positive for palpitations. Negative for chest pain and leg swelling.   Gastrointestinal: Positive for abdominal distention, abdominal pain, constipation, diarrhea, nausea, GERD and indigestion. Negative for anal bleeding, blood in stool, rectal pain and vomiting.   Endocrine: Positive for  heat intolerance, polydipsia and polyphagia. Negative for cold intolerance and polyuria.   Musculoskeletal: Positive for neck pain and neck stiffness. Negative for arthralgias, back pain, gait problem, joint swelling, myalgias and bursitis.   Allergic/Immunologic: Negative for environmental allergies, food allergies and immunocompromised state.   Neurological: Positive for tremors, weakness, light-headedness and headache. Negative for dizziness, seizures, syncope, facial asymmetry, speech difficulty, numbness, memory problem and confusion.   Hematological: Negative for adenopathy. Does not bruise/bleed easily.   Psychiatric/Behavioral: Positive for agitation and sleep disturbance. Negative for behavioral problems, decreased concentration, dysphoric mood, hallucinations, self-injury, suicidal ideas, negative for hyperactivity, depressed mood and stress. The patient is nervous/anxious.      I reviewed the above-noted review of systems    PHYSICAL EXAM   LMP 04/20/2020   General: Well-developed pleasant white female no apparent acute distress.  HEENT: Anicteric sclera.  Neck: Normal neck motion without rigidity observed  Lungs: Grossly normal respirations without audible wheezing or labored respirations noted  Extremeties: Normal upper extremity motion grossly observed  Neurologic: Normal cognition.  Normal affect.  Alert and oriented x3.  Normal speech       Results Review:  None      ASSESSMENT  1.-  Intermittent dysphagia to solids resolved with esophageal dilation 20 mm.  Redilate as needed  2.-  Epigastric pain.  Myofascial.  Resolved with Elavil 25 mg p.o. nightly.  Continue  3.-  Insomnia.  She states that the Elavil is effective in helping her sleep better and she can certainly remain on it long-term for this reason  4.-  Abnormal imaging study with 2.9 cm left hepatic lobe lesion suggestive of focal nodular hyperplasia.  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  Consider colonoscopy at later date.  Will readdress at her six-month follow-up appointment    PLAN  1.-  Alpha-fetoprotein and MRI in 6 months.  Patient will call us to schedule these at her convenience  2.-  Colonoscopy is recommended later this year  3.-  Continue Elavil      I discussed the patient's findings and my recommendations with patient    Cmmasoud Echeverria MD  5/14/2020   14:33    Much of this note is an electronic transcription of spoken language to printed text. Electronic transcription of spoken language may permit erroneous, nonsensical word phrases to be inadvertently transcribed.  Although I have reviewed the note for these errors, some may still be present.

## 2020-05-18 ENCOUNTER — TELEPHONE (OUTPATIENT)
Dept: GASTROENTEROLOGY | Facility: CLINIC | Age: 30
End: 2020-05-18

## 2020-05-18 NOTE — TELEPHONE ENCOUNTER
Called patient and scheduled her for a 6 month office follow up . Notified patient she will get a repeat MRI and AFP prior to her appointment with . 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   Patient confirmed she understood and had no additional questions.

## 2020-05-18 NOTE — TELEPHONE ENCOUNTER
----- Message from Cm Echeverria MD sent at 5/14/2020  3:10 PM EDT -----  She needs a follow-up appointment in 6 months at which time we will get an MRI of her liver and a repeat alpha-fetoprotein.  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

## 2020-09-14 ENCOUNTER — OFFICE VISIT (OUTPATIENT)
Dept: NEUROSURGERY | Facility: CLINIC | Age: 30
End: 2020-09-14

## 2020-09-14 VITALS
TEMPERATURE: 97.1 F | HEIGHT: 67 IN | DIASTOLIC BLOOD PRESSURE: 78 MMHG | WEIGHT: 160.4 LBS | SYSTOLIC BLOOD PRESSURE: 108 MMHG | BODY MASS INDEX: 25.18 KG/M2

## 2020-09-14 DIAGNOSIS — M51.26 HERNIATED LUMBAR INTERVERTEBRAL DISC: Primary | ICD-10-CM

## 2020-09-14 PROCEDURE — 99203 OFFICE O/P NEW LOW 30 MIN: CPT | Performed by: NEUROLOGICAL SURGERY

## 2020-09-14 NOTE — PATIENT INSTRUCTIONS
Call Dr. Nelson on a Monday or Tuesday (ask for Nargis or Elidia) and leave a message.     Dr. Nelson will call you back at the end of the day as soon as he can.     [phone] Nargis    [phone] Elidia Powell    Call after Bibi Pablo

## 2020-09-14 NOTE — PROGRESS NOTES
"Cooper Bah  1990  [id number]      Chief Complaint   Patient presents with   • Back Pain   • Numbness     BLE       HISTORY OF PRESENT ILLNESS: This is a 30-year-old female I saw several years ago who at the present time has pain in her back and coccyx.  A lumbar MRI was performed she referred for neurosurgical consultation.  Her pain is axial nonradicular.  I am unable to elicit any history that would suggest nerve root or spinal cord compression.  Her diagnostic studies initially showed the presence of mild disc degeneration L4-L5 with Schmorl's nodes in the upper lumbar region.  A \"new\" MRI has been reported and she is seen for consultation.  The pain is further persistent although she has \"good and bad days\".    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        Family History   Problem Relation Age of Onset   • Arthritis Mother    • Asthma Mother    • Cancer Mother    • COPD Mother    • Depression Mother    • Diabetes Mother    • Hyperlipidemia Mother    • Hypertension Mother    • Mental illness Mother    • Miscarriages / Stillbirths Mother    • Colon cancer Mother    • Arthritis Father    • Cancer Father    • Hyperlipidemia Father    • Arthritis Brother    • Asthma Brother    • Diabetes Brother    • Cancer Maternal Aunt    • Early death Maternal Aunt    • Cancer Maternal Grandmother    • COPD Maternal Grandmother    • Early death Maternal Grandfather    • Heart disease " Maternal Grandfather    • Hyperlipidemia Maternal Grandfather    • Hypertension Maternal Grandfather        Social History     Socioeconomic History   • Marital status: Single     Spouse name: Not on file   • Number of children: Not on file   • Years of education: Not on file   • Highest education level: Not on file   Tobacco Use   • Smoking status: Current Every Day Smoker     Packs/day: 1.00     Types: Cigarettes   • Smokeless tobacco: Never Used   Substance and Sexual Activity   • Alcohol use: Not Currently   • Drug use: Not Currently   • Sexual activity: Defer       No Known Allergies      Current Outpatient Medications:   •  Magnesium 250 MG tablet, Take  by mouth., Disp: , Rfl:     Review of Systems   Constitutional: Negative for activity change, appetite change, chills, diaphoresis, fatigue, fever and unexpected weight change.   HENT: Negative for congestion, dental problem, drooling, ear discharge, ear pain, facial swelling, hearing loss, mouth sores, nosebleeds, postnasal drip, rhinorrhea, sinus pressure, sneezing, sore throat, tinnitus, trouble swallowing and voice change.    Eyes: Negative for photophobia, pain, discharge, redness, itching and visual disturbance.   Respiratory: Negative for apnea, cough, choking, chest tightness, shortness of breath, wheezing and stridor.    Cardiovascular: Negative for chest pain, palpitations and leg swelling.   Gastrointestinal: Negative for abdominal distention, abdominal pain, anal bleeding, blood in stool, constipation, diarrhea, nausea, rectal pain and vomiting.   Endocrine: Negative for cold intolerance, heat intolerance, polydipsia, polyphagia and polyuria.   Genitourinary: Negative for decreased urine volume, difficulty urinating, dysuria, enuresis, flank pain, frequency, genital sores, hematuria and urgency.   Musculoskeletal: Positive for back pain. Negative for arthralgias, gait problem, joint swelling, myalgias, neck pain and neck stiffness.   Skin: Negative  "for color change, pallor, rash and wound.   Allergic/Immunologic: Negative for environmental allergies, food allergies and immunocompromised state.   Neurological: Positive for numbness. Negative for dizziness, tremors, seizures, syncope, facial asymmetry, speech difficulty, weakness, light-headedness and headaches.   Hematological: Negative for adenopathy. Does not bruise/bleed easily.   Psychiatric/Behavioral: Negative for agitation, behavioral problems, confusion, decreased concentration, dysphoric mood, hallucinations, self-injury, sleep disturbance and suicidal ideas. The patient is not nervous/anxious and is not hyperactive.    All other systems reviewed and are negative.      Vitals:    09/14/20 1301   BP: 108/78   BP Location: Right arm   Patient Position: Sitting   Cuff Size: Adult   Temp: 97.1 °F (36.2 °C)   TempSrc: Infrared   Weight: 72.8 kg (160 lb 6.4 oz)   Height: 170.2 cm (67\")       Neurological Examination:    Mental status/speech: The patient is alert and oriented.  Speech is clear without aphysia or dysarthria.  No overt cognitive deficits.    Cranial nerve examination:    Olfaction: Smell is intact.  Vision: Vision is intact without visual field abnormalities.  Funduscopic examination is normal.  No pupillary irregularity.  Ocular motor examination: The extraocular muscles are intact.  There is no diplopia.  The pupil is round and reactive to both light and accommodation.  There is no nystagmus.  Facial movement/sensation: There is no facial weakness.  Sensation is intact in the first, second, and third divisions of the trigeminal nerve.  The corneal reflex is intact.  Auditory: Hearing is intact to finger rub bilaterally.  Cranial nerves IX, X, XI, XII: Phonation is normal.  No dysphagia.  Tongue is protruded in the midline without atrophy.  The gag reflex is intact.  Shoulder shrug is normal.    Musculoligamentous ligamentous examination: BMI 25.1, weight 165 pounds.  Straight leg raising, " Lasegue's and flip test negative.  No Babinski Santi or clonus.  Gait normal.  I am unable to find weakness, sensory loss or reflex asymmetry.  She does have tenderness to palpation of the sacrum and coccyx.          Medical Decision Making:     Diagnostic Data Set: Lumbar MRI shows prominent disc bulge at L4-L5 with degeneration.  She also has Schmorl's nodes at the upper lumbar region.      Assessment: Her symptoms are referable to degeneration and I do not think that surgical intervention is indicated or warranted at this point in time.          Recommendations: I have sent her to physical therapy, ask her to see Dr. Pablo for a epidural steroid injection.  Also have ordered x-rays of her pelvis and coccyx for completeness sake.  The symptoms are degenerative in nature.  She works as a CNA with repetitive lifting and bending which tend to exacerbate her symptoms.  However there are no symptoms to suggest neurological dysfunction.  I am hopeful we can manage her pain otherwise.  I will keep you informed thanks for allow me to see her.      I greatly appreciate the opportunity to see and evaluate this individual.  If you have questions or concerns regarding issues that I may have overlooked please call me at any time: [phone].  Marcelino Nelson M.D.  [facility]  [address] .  [facility]  [id number]

## 2020-10-06 ENCOUNTER — TELEPHONE (OUTPATIENT)
Dept: NEUROSURGERY | Facility: CLINIC | Age: 30
End: 2020-10-06

## 2020-11-12 ENCOUNTER — TELEPHONE (OUTPATIENT)
Dept: GASTROENTEROLOGY | Facility: CLINIC | Age: 30
End: 2020-11-12

## 2020-11-12 DIAGNOSIS — R16.0 LIVER MASS: Primary | ICD-10-CM

## 2020-11-12 NOTE — TELEPHONE ENCOUNTER
Called patient and notified her its time for repeat labs and MRI. Patient confirmed she would have this completed prior to follow up appointment with .

## 2020-11-24 ENCOUNTER — HOSPITAL ENCOUNTER (OUTPATIENT)
Dept: MRI IMAGING | Facility: HOSPITAL | Age: 30
Discharge: HOME | End: 2020-11-24
Admitting: INTERNAL MEDICINE

## 2020-11-24 DIAGNOSIS — R16.0 LIVER MASS: ICD-10-CM

## 2020-11-24 PROCEDURE — A9577 INJ MULTIHANCE: HCPCS | Performed by: INTERNAL MEDICINE

## 2020-11-24 PROCEDURE — 74183 MRI ABD W/O CNTR FLWD CNTR: CPT

## 2020-11-24 PROCEDURE — 0 GADOBENATE DIMEGLUMINE 529 MG/ML SOLUTION: Performed by: INTERNAL MEDICINE

## 2020-11-24 RX ADMIN — GADOBENATE DIMEGLUMINE 15 ML: 529 INJECTION, SOLUTION INTRAVENOUS at 15:17

## 2020-12-01 ENCOUNTER — TELEMEDICINE (OUTPATIENT)
Dept: GASTROENTEROLOGY | Facility: CLINIC | Age: 30
End: 2020-12-01

## 2020-12-01 DIAGNOSIS — K76.89 FOCAL NODULAR HYPERPLASIA OF LIVER: ICD-10-CM

## 2020-12-01 DIAGNOSIS — R16.0 LIVER MASS: Primary | ICD-10-CM

## 2020-12-01 DIAGNOSIS — Z80.0 FAMILY HISTORY OF GI MALIGNANCY: ICD-10-CM

## 2020-12-01 PROCEDURE — 99213 OFFICE O/P EST LOW 20 MIN: CPT | Performed by: INTERNAL MEDICINE

## 2020-12-01 NOTE — PROGRESS NOTES
GASTROENTEROLOGY TELEMEDICINE OFFICE NOTE  Cooper Bah  [id number]  1990    CARE TEAM  Patient Care Team:  Marquise Garcia DO as PCP - General (Family Medicine)  Lyndon Rodarte MD as Consulting Physician (Family Medicine)    No ref. provider found     Chief complaint  Abnormal imaging study    HISTORY OF PRESENT ILLNESS:  Patient presents for follow-up after recent MRI.    She had an MRI revealing focal nodular hyperplasia and most recent MRI reveals no changes in this finding.  Again radiologist felt it was consistent with focal nodular hyperplasia, and if anything, there may been some mild decrease in size of this lesion.    She presents without any localizing complaints.    She is requesting colonoscopy in the setting of her mother had colon cancer in her 20s and also giving a history of an aunt who had esophageal cancer.  Last colonoscopy was in .    She presents without dysphagia, odynophagia, early satiety, unexplained weight loss, melena or bright red blood per rectum.    When I initially saw her she was having dysphagia to solids and underwent EGD which was negative for eosinophilic esophagitis, H. pylori gastritis or celiac disease.  She started on Elavil for myofascial pain which resolved with this regimen.    Dysphagia solids resolved following esophageal dilation.    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          MEDICATIONS:    Current Outpatient Medications:   •  Magnesium 250 MG tablet, Take  by mouth., Disp: , Rfl:     ALLERGIES  No Known Allergies    FAMILY HISTORY:  Family History   Problem Relation Age of Onset   • Arthritis Mother    • Asthma Mother    • Cancer Mother    • COPD Mother    • Depression Mother    • Diabetes Mother    • Hyperlipidemia Mother    • Hypertension Mother    • Mental illness Mother    • Miscarriages / Stillbirths Mother    • Colon cancer Mother    • Arthritis Father    • Cancer Father    • Hyperlipidemia Father    • Arthritis Brother    • Asthma Brother    • Diabetes Brother    • Cancer Maternal Aunt    • Early death Maternal Aunt    • Cancer Maternal Grandmother    • COPD Maternal Grandmother    • Early death Maternal Grandfather    • Heart disease Maternal Grandfather    • Hyperlipidemia Maternal Grandfather    • Hypertension Maternal Grandfather        SOCIAL HISTORY  Social History     Socioeconomic History   • Marital status: Single     Spouse name: Not on file   • Number of children: Not on file   • Years of education: Not on file   • Highest education level: Not on file   Tobacco Use   • Smoking status: Current Every Day Smoker     Packs/day: 1.00     Types: Cigarettes   • Smokeless tobacco: Never Used   Substance and Sexual Activity   • Alcohol use: Not Currently   • Drug use: Not Currently   • Sexual activity: Defer     Socioeconomic History:  Single.  1 pack/day smoker cigarettes.  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  She lives with her parents.       REVIEW OF SYSTEMS  Review of Systems   Constitutional: Positive for unexpected weight gain. Negative for unexpected weight loss.   HENT: Negative for trouble swallowing.    Eyes: Negative.    Respiratory: Negative.    Gastrointestinal: Negative for abdominal distention, abdominal pain, anal bleeding, blood in stool, constipation, diarrhea, nausea, rectal pain, vomiting, GERD  and indigestion.   Endocrine: Negative.    Genitourinary: Negative.    Musculoskeletal: Negative.    Skin: Negative.    Allergic/Immunologic: Negative.    Neurological: Negative.    Hematological: Negative.    Psychiatric/Behavioral: Negative.      I reviewed the above-noted review of systems.    PHYSICAL EXAM   General: Pleasant female in no apparent or acute distress  HEENT: Grossly anicteric sclera  Neck: Grossly normal neck motion without observed rigidity  Lungs: Grossly normal respirations without labored breathing or audible wheezing.  Speaking in full sentences  Neurologic: Normal cognition.  Normal affect.  Alert and oriented.  Normal speech.     ASSESSMENT  1.-History of dysphagia to solids resolved with esophageal dilation  2.-Family of colon cancer.  Suspicious for Agudelo syndrome.  Colonoscopy recommended  3.-Focal nodular hyperplasia liver.  Rescan in 2 years  4.-Resolved myofascial abdominal pain    PLAN  1.-MRCP in 2 years  2.-Schedule screening colonoscopy  3.-Repeat esophageal dilation as needed for dysphagia to solids      Cm Echeverria MD  12/1/2020   14:22 EST    .VIDEOCONSENT  The use of a video visit has been reviewed with the patient and verbal informed consent have been obtained.

## 2020-12-05 PROBLEM — K76.89 FOCAL NODULAR HYPERPLASIA OF LIVER: Status: ACTIVE | Noted: 2020-12-05

## 2020-12-11 RX ORDER — SODIUM, POTASSIUM,MAG SULFATES 17.5-3.13G
2 SOLUTION, RECONSTITUTED, ORAL ORAL TAKE AS DIRECTED
Qty: 354 ML | Refills: 0 | Status: SHIPPED | OUTPATIENT
Start: 2020-12-11 | End: 2021-01-20 | Stop reason: SDUPTHER

## 2020-12-28 ENCOUNTER — APPOINTMENT (OUTPATIENT)
Dept: PREADMISSION TESTING | Facility: HOSPITAL | Age: 30
End: 2020-12-28

## 2021-01-20 RX ORDER — SODIUM, POTASSIUM,MAG SULFATES 17.5-3.13G
2 SOLUTION, RECONSTITUTED, ORAL ORAL TAKE AS DIRECTED
Qty: 354 ML | Refills: 0 | Status: SHIPPED | OUTPATIENT
Start: 2021-01-20 | End: 2021-05-19

## 2021-01-25 ENCOUNTER — TELEPHONE (OUTPATIENT)
Dept: GASTROENTEROLOGY | Facility: CLINIC | Age: 31
End: 2021-01-25

## 2021-02-02 ENCOUNTER — OUTSIDE FACILITY SERVICE (OUTPATIENT)
Dept: GASTROENTEROLOGY | Facility: CLINIC | Age: 31
End: 2021-02-02

## 2021-02-02 PROCEDURE — OUTSIDEPOS PR OUTSIDE POS PLACEHOLDER: Performed by: INTERNAL MEDICINE

## 2021-05-12 ENCOUNTER — APPOINTMENT (OUTPATIENT)
Dept: ULTRASOUND IMAGING | Facility: HOSPITAL | Age: 31
End: 2021-05-12

## 2021-05-12 ENCOUNTER — HOSPITAL ENCOUNTER (EMERGENCY)
Facility: HOSPITAL | Age: 31
Discharge: HOME | End: 2021-05-13
Attending: EMERGENCY MEDICINE | Admitting: EMERGENCY MEDICINE

## 2021-05-12 DIAGNOSIS — O20.0 THREATENED MISCARRIAGE: Primary | ICD-10-CM

## 2021-05-12 LAB
ABO GROUP BLD: NORMAL
ALBUMIN SERPL-MCNC: 4.2 G/DL (ref 3.5–5.2)
ALBUMIN/GLOB SERPL: 1.7 G/DL
ALP SERPL-CCNC: 49 U/L (ref 39–117)
ALT SERPL W P-5'-P-CCNC: 18 U/L (ref 1–33)
ANION GAP SERPL CALCULATED.3IONS-SCNC: 10 MMOL/L (ref 5–15)
AST SERPL-CCNC: 19 U/L (ref 1–32)
BASOPHILS # BLD AUTO: 0.07 10*3/MM3 (ref 0–0.2)
BASOPHILS NFR BLD AUTO: 0.6 % (ref 0–1.5)
BILIRUB SERPL-MCNC: 0.2 MG/DL (ref 0–1.2)
BLD GP AB SCN SERPL QL: NEGATIVE
BUN SERPL-MCNC: 12 MG/DL (ref 6–20)
BUN/CREAT SERPL: 18.2 (ref 7–25)
CALCIUM SPEC-SCNC: 9.6 MG/DL (ref 8.6–10.5)
CHLORIDE SERPL-SCNC: 104 MMOL/L (ref 98–107)
CO2 SERPL-SCNC: 24 MMOL/L (ref 22–29)
CREAT SERPL-MCNC: 0.66 MG/DL (ref 0.57–1)
DEPRECATED RDW RBC AUTO: 42.4 FL (ref 37–54)
EOSINOPHIL # BLD AUTO: 0.15 10*3/MM3 (ref 0–0.4)
EOSINOPHIL NFR BLD AUTO: 1.3 % (ref 0.3–6.2)
ERYTHROCYTE [DISTWIDTH] IN BLOOD BY AUTOMATED COUNT: 12.2 % (ref 12.3–15.4)
GFR SERPL CREATININE-BSD FRML MDRD: 104 ML/MIN/1.73
GFR SERPL CREATININE-BSD FRML MDRD: 126 ML/MIN/1.73
GLOBULIN UR ELPH-MCNC: 2.5 GM/DL
GLUCOSE SERPL-MCNC: 90 MG/DL (ref 65–99)
HCT VFR BLD AUTO: 44.1 % (ref 34–46.6)
HGB BLD-MCNC: 14.7 G/DL (ref 12–15.9)
HOLD SPECIMEN: NORMAL
HOLD SPECIMEN: NORMAL
IMM GRANULOCYTES # BLD AUTO: 0.07 10*3/MM3 (ref 0–0.05)
IMM GRANULOCYTES NFR BLD AUTO: 0.6 % (ref 0–0.5)
LYMPHOCYTES # BLD AUTO: 3.75 10*3/MM3 (ref 0.7–3.1)
LYMPHOCYTES NFR BLD AUTO: 32.4 % (ref 19.6–45.3)
MCH RBC QN AUTO: 31.6 PG (ref 26.6–33)
MCHC RBC AUTO-ENTMCNC: 33.3 G/DL (ref 31.5–35.7)
MCV RBC AUTO: 94.8 FL (ref 79–97)
MONOCYTES # BLD AUTO: 0.8 10*3/MM3 (ref 0.1–0.9)
MONOCYTES NFR BLD AUTO: 6.9 % (ref 5–12)
NEUTROPHILS NFR BLD AUTO: 58.2 % (ref 42.7–76)
NEUTROPHILS NFR BLD AUTO: 6.72 10*3/MM3 (ref 1.7–7)
NRBC BLD AUTO-RTO: 0 /100 WBC (ref 0–0.2)
PLATELET # BLD AUTO: 259 10*3/MM3 (ref 140–450)
PMV BLD AUTO: 10.2 FL (ref 6–12)
POTASSIUM SERPL-SCNC: 4.2 MMOL/L (ref 3.5–5.2)
PROT SERPL-MCNC: 6.7 G/DL (ref 6–8.5)
RBC # BLD AUTO: 4.65 10*6/MM3 (ref 3.77–5.28)
RH BLD: NEGATIVE
SODIUM SERPL-SCNC: 138 MMOL/L (ref 136–145)
WBC # BLD AUTO: 11.56 10*3/MM3 (ref 3.4–10.8)
WHOLE BLOOD HOLD SPECIMEN: NORMAL
WHOLE BLOOD HOLD SPECIMEN: NORMAL

## 2021-05-12 PROCEDURE — 86850 RBC ANTIBODY SCREEN: CPT | Performed by: EMERGENCY MEDICINE

## 2021-05-12 PROCEDURE — 85025 COMPLETE CBC W/AUTO DIFF WBC: CPT

## 2021-05-12 PROCEDURE — 80053 COMPREHEN METABOLIC PANEL: CPT | Performed by: EMERGENCY MEDICINE

## 2021-05-12 PROCEDURE — 76817 TRANSVAGINAL US OBSTETRIC: CPT

## 2021-05-12 PROCEDURE — 84702 CHORIONIC GONADOTROPIN TEST: CPT | Performed by: EMERGENCY MEDICINE

## 2021-05-12 PROCEDURE — 99283 EMERGENCY DEPT VISIT LOW MDM: CPT

## 2021-05-12 PROCEDURE — 86901 BLOOD TYPING SEROLOGIC RH(D): CPT | Performed by: EMERGENCY MEDICINE

## 2021-05-12 PROCEDURE — 86900 BLOOD TYPING SEROLOGIC ABO: CPT | Performed by: EMERGENCY MEDICINE

## 2021-05-12 RX ORDER — SODIUM CHLORIDE 0.9 % (FLUSH) 0.9 %
10 SYRINGE (ML) INJECTION AS NEEDED
Status: DISCONTINUED | OUTPATIENT
Start: 2021-05-12 | End: 2021-05-13 | Stop reason: HOSPADM

## 2021-05-13 ENCOUNTER — TELEPHONE (OUTPATIENT)
Dept: OBSTETRICS AND GYNECOLOGY | Facility: CLINIC | Age: 31
End: 2021-05-13

## 2021-05-13 VITALS
HEART RATE: 88 BPM | WEIGHT: 171 LBS | TEMPERATURE: 98.9 F | RESPIRATION RATE: 22 BRPM | DIASTOLIC BLOOD PRESSURE: 78 MMHG | OXYGEN SATURATION: 99 % | BODY MASS INDEX: 26.84 KG/M2 | HEIGHT: 67 IN | SYSTOLIC BLOOD PRESSURE: 112 MMHG

## 2021-05-13 LAB
HCG INTACT+B SERPL-ACNC: NORMAL MIU/ML
HOLD SPECIMEN: NORMAL
NUMBER OF DOSES: NORMAL

## 2021-05-13 PROCEDURE — 96372 THER/PROPH/DIAG INJ SC/IM: CPT

## 2021-05-13 PROCEDURE — 25010000002 RHO D IMMUNE GLOBULIN 1500 UNIT/2ML SOLUTION PREFILLED SYRINGE: Performed by: EMERGENCY MEDICINE

## 2021-05-13 RX ADMIN — HUMAN RHO(D) IMMUNE GLOBULIN 300 MCG: 1500 SOLUTION INTRAMUSCULAR; INTRAVENOUS at 00:22

## 2021-05-13 NOTE — TELEPHONE ENCOUNTER
She was given Rhogam in the ER. She is approx 5w 5d - apt for next Wednesday. Tylenol no motrin, PNV with DHA   Pulses equal bilaterally, no edema present.

## 2021-05-13 NOTE — TELEPHONE ENCOUNTER
Patient states she was seen in [facility] ER for bleeding and threatened miscarriage was told to follow up with our office

## 2021-05-19 ENCOUNTER — INITIAL PRENATAL (OUTPATIENT)
Dept: OBSTETRICS AND GYNECOLOGY | Facility: CLINIC | Age: 31
End: 2021-05-19

## 2021-05-19 VITALS — SYSTOLIC BLOOD PRESSURE: 100 MMHG | WEIGHT: 168.8 LBS | BODY MASS INDEX: 26.44 KG/M2 | DIASTOLIC BLOOD PRESSURE: 62 MMHG

## 2021-05-19 DIAGNOSIS — O20.0 THREATENED ABORTION: ICD-10-CM

## 2021-05-19 DIAGNOSIS — O34.219 HISTORY OF CESAREAN SECTION COMPLICATING PREGNANCY: ICD-10-CM

## 2021-05-19 DIAGNOSIS — N80.9 ENDOMETRIOSIS: ICD-10-CM

## 2021-05-19 DIAGNOSIS — Z3A.01 LESS THAN 8 WEEKS GESTATION OF PREGNANCY: Primary | ICD-10-CM

## 2021-05-19 DIAGNOSIS — Z12.4 SCREENING FOR CERVICAL CANCER: ICD-10-CM

## 2021-05-19 DIAGNOSIS — O26.899 RH NEGATIVE, ANTEPARTUM: ICD-10-CM

## 2021-05-19 DIAGNOSIS — R10.2 PELVIC PAIN: ICD-10-CM

## 2021-05-19 DIAGNOSIS — Z67.91 RH NEGATIVE, ANTEPARTUM: ICD-10-CM

## 2021-05-19 DIAGNOSIS — N76.0 ACUTE VAGINITIS: ICD-10-CM

## 2021-05-19 PROBLEM — Z34.90 PREGNANCY: Status: ACTIVE | Noted: 2021-05-19

## 2021-05-19 PROCEDURE — 99204 OFFICE O/P NEW MOD 45 MIN: CPT | Performed by: OBSTETRICS & GYNECOLOGY

## 2021-05-19 RX ORDER — METRONIDAZOLE 500 MG/1
500 TABLET ORAL 2 TIMES DAILY
Qty: 14 TABLET | Refills: 0 | Status: SHIPPED | OUTPATIENT
Start: 2021-05-19 | End: 2021-05-26

## 2021-05-19 RX ORDER — ALBUTEROL SULFATE 90 UG/1
AEROSOL, METERED RESPIRATORY (INHALATION)
COMMUNITY
Start: 2021-03-29

## 2021-05-19 RX ORDER — PRENATAL VIT,CAL 76/IRON/FOLIC 29 MG-1 MG
1 TABLET ORAL DAILY
Qty: 30 TABLET | Refills: 11 | Status: SHIPPED | OUTPATIENT
Start: 2021-05-19 | End: 2021-06-18

## 2021-05-19 NOTE — PROGRESS NOTES
Initial ob visit     CC- Here for care of pregnancy        Cooper Bah is a 31 y.o. female, , who presents for her first obstetrical visit.  Her last LMP was 2021. Patient is pregnant.  Patient states she started having cramping even before she missed her last menses around May 8 and had bleeding on May 12 for 1 day she went to the [facility] emergency department and had ultrasound showing a viable pregnancy.  She received RhoGam at that time.    Patient has a history of endometriosis and is status post a diagnostic laparoscopy at age 17 in [place].  She has a history of dysmenorrhea with her periods but denied any abnormal bleeding.  States her menses have been regular off of oral contraceptives.  She did a early pregnancy test for states she missed her period.    Patient had been complaining of vaginal discharge with odor.  She denies any itching or known STD exposure.    OB History    Para Term  AB Living   2 1 1     1   SAB TAB Ectopic Molar Multiple Live Births             1      # Outcome Date GA Lbr Praful/2nd Weight Sex Delivery Anes PTL Lv   2 Current            1 Term         MARY ALICE       Initial positive test date : 2021, UPT          Prior obstetric issues, potential pregnancy concerns: No  Family history of genetic issues (includes FOB): No  Prior infections concerning in pregnancy (Rash, fever in last 2 weeks): No  Varicella Hx - No  Prior testing for Cystic Fibrosis Carrier or Sickle Cell Trait- no  Prepregnancy BMI - Body mass index is 26.44 kg/m².  History of STD: yes GC/CHLAMYDIA  Ultrasound Today: Yes.  Findings showed viable IUP measuring 6 weeks and 2/7 no subchorionic hemorrhage is noted.  Ovaries appear normal with a corpus luteum on the left.  I have personally evaluated the U/S and agree with the findings. Nico Ward MD    Additional Pertinent History   Last Pap :   Last Completed Pap Smear     This patient has no relevant Health Maintenance data.         History of abnormal Pap smear: no  Family history of uterine, colon, breast, or ovarian cancer: yes - colon cancer- mother  Feelings of Anxiety or Depression: yes - Anxiety  Tobacco Usage?: Yes Cooper Bah  reports that she has been smoking cigarettes. She has been smoking about 1.00 pack per day. She has never used smokeless tobacco.. I have educated her on the risk of diseases from using tobacco products such as cancer, COPD and heart disease.     I advised her to quit and she is not willing to quit.    I spent 5 minutes counseling the patient.        Alcohol/Drug Use?: NO  Over the age of 35 at delivery: no  Desires Genetic Screening: None  Flu Status: Already given in current flu season    PMH  Past Medical History:   Diagnosis Date   • Acid reflux 2012   • PONV (postoperative nausea and vomiting)        Current Outpatient Medications:   •  metroNIDAZOLE (Flagyl) 500 MG tablet, Take 1 tablet by mouth 2 (Two) Times a Day for 7 days., Disp: 14 tablet, Rfl: 0  •  Prenatal Vit-Fe Fumarate-FA (PRENATAL 19 PO), Take 1 tablet by mouth., Disp: , Rfl:   •  prenatal vitamins (PRENATABS RX) 29-1 MG tablet tablet, Take 1 tablet by mouth Daily for 30 days., Disp: 30 tablet, Rfl: 11  •  Ventolin  (90 Base) MCG/ACT inhaler, , Disp: , Rfl:     The additional following portions of the patient's history were reviewed and updated as appropriate: allergies, current medications, past family history, past medical history, past social history, past surgical history and problem list.    Review of Systems   Review of Systems  Current obstetric complaints : cramping   All systems reviewed and otherwise normal.    I have reviewed and agree with the HPI, ROS, and historical information as entered above. Nico Ward MD    /62   Wt 76.6 kg (168 lb 12.8 oz)   LMP 04/08/2021 (Approximate)   BMI 26.44 kg/m²     Physical Exam  General:  well developed; well nourished  no acute distress   Chest/Respiratory: No  labored breathing, normal respiratory effort, normal appearance, no respiratory noises noted   Heart:  normal rate, regular rhythm,  no murmurs, rubs, or gallops   Thyroid: normal to inspection and palpation   Breasts:  Not performed.   Abdomen: soft, non-tender; no masses  no umbilical or inguinal hernias are present  no hepato-splenomegaly   Pelvis: Clinical staff was present for exam  External genitalia:  normal appearance of the external genitalia including Bartholin's and Radford's glands.  :  urethral meatus normal;  Vaginal:  normal pink mucosa without prolapse or lesions. discharge present -  white; wet prep done: clue cells are present;  Cervix:  normal appearance.  Uterus:  normal size, shape and consistency. anteverted;  Adnexa:  normal bimanual exam of the adnexa.        Assessment and Plan    Problem List Items Addressed This Visit            Pregnancy - Primary    Overview     Received RhoGam in ER for bleeding on 2021.  Dates by 6 to 7-week ultrasound.  History of prior  for breech.  Discussed option of .  Persistent pelvic cramping.         Relevant Orders    Urinalysis without microscopic (no culture) - Urine, Clean Catch    Urine Culture - Urine, Urine, Clean Catch    Urine Drug Screen - Urine, Clean Catch    HIV-1 / O / 2 Ag / Antibody 4th Generation    OB Panel With HIV    Type & Screen    Rh negative, antepartum    Overview     2021 RhoGam in ER for threatened AB         Screening for cervical cancer    Overview     Last Pap smear in 2020.  Repeat Pap smear 6 weeks postpartum.         Endometriosis    Overview     ; diagnostic laparoscopy with treatment of endometriosis at 17 years of age for dysmenorrhea and abnormal bleeding.         History of  section complicating pregnancy    Overview     Prior C section for Breech; ; [place].   Discussed option of .  Patient does have a prior history of pelvic fracture.         Pelvic pain     Overview      Midline severe cramping.   Started one week prior to missed menses.         Relevant Orders    US Ob Transvaginal    Threatened     Relevant Orders    US Ob Transvaginal      Other Visit Diagnoses     Acute vaginitis        vaginal discharge and odor.    Relevant Orders    Chlamydia trachomatis, Neisseria gonorrhoeae, Trichomonas vaginalis, PCR - Swab, Cervix    Bacterial Vaginosis, AUGUSTINE - Swab, Vagina          1. Pregnancy at 6-2/7 weeks.  Threatened AB with no further bleeding.  No sign of subchorionic hemorrhage.    2. Persistent midline pelvic cramping.  She does have history of endometriosis and prior .  Cramping has persisted since prior to missing her menses.  [facility] ultrasound in 3 weeks.  3. Reviewed routine prenatal care with the office and educational materials given  4. Lab(s) Ordered  5. Discussed options for genetic testing including first trimester nuchal translucency screen, genetic disease carrier testing, quadruple screen, and Arpin.  6. Medication(s) Ordered  7. Patient is on Prenatal vitamins  8. U/S ordered at follow up  9. Rx for Flagyl sent in for BV.  She can start taking it after 10 to 12 weeks.  Return in about 3 weeks (around 2021) for Follow-up ultrasound.      Nico Ward MD  2021

## 2021-05-21 LAB
ABO GROUP BLD: ABNORMAL
AMPHETAMINES UR QL SCN: NEGATIVE NG/ML
APPEARANCE UR: CLEAR
BACTERIA UR CULT: NORMAL
BACTERIA UR CULT: NORMAL
BARBITURATES UR QL SCN: NEGATIVE NG/ML
BASOPHILS # BLD AUTO: 0.1 X10E3/UL (ref 0–0.2)
BASOPHILS NFR BLD AUTO: 1 %
BENZODIAZ UR QL SCN: NEGATIVE NG/ML
BILIRUB UR QL STRIP: NEGATIVE
BLD GP AB SCN SERPL QL: ABNORMAL
BLD GP AB SCN SERPL QL: POSITIVE
BLOOD GROUP ANTIBODIES SERPL: ABNORMAL
BZE UR QL SCN: NEGATIVE NG/ML
CANNABINOIDS UR QL SCN: NEGATIVE NG/ML
COLOR UR: YELLOW
CREAT UR-MCNC: 160.4 MG/DL (ref 20–300)
EOSINOPHIL # BLD AUTO: 0.1 X10E3/UL (ref 0–0.4)
EOSINOPHIL NFR BLD AUTO: 1 %
ERYTHROCYTE [DISTWIDTH] IN BLOOD BY AUTOMATED COUNT: 12.2 % (ref 11.7–15.4)
GLUCOSE UR QL: NEGATIVE
HBV SURFACE AG SERPL QL IA: NEGATIVE
HCT VFR BLD AUTO: 42.8 % (ref 34–46.6)
HCV AB S/CO SERPL IA: <0.1 S/CO RATIO (ref 0–0.9)
HGB BLD-MCNC: 14.5 G/DL (ref 11.1–15.9)
HGB UR QL STRIP: NEGATIVE
HIV 1+2 AB+HIV1 P24 AG SERPL QL IA: NON REACTIVE
IMM GRANULOCYTES # BLD AUTO: 0 X10E3/UL (ref 0–0.1)
IMM GRANULOCYTES NFR BLD AUTO: 0 %
KETONES UR QL STRIP: NEGATIVE
LABORATORY COMMENT REPORT: NORMAL
LEUKOCYTE ESTERASE UR QL STRIP: NEGATIVE
LYMPHOCYTES # BLD AUTO: 2.5 X10E3/UL (ref 0.7–3.1)
LYMPHOCYTES NFR BLD AUTO: 21 %
MCH RBC QN AUTO: 31.8 PG (ref 26.6–33)
MCHC RBC AUTO-ENTMCNC: 33.9 G/DL (ref 31.5–35.7)
MCV RBC AUTO: 94 FL (ref 79–97)
METHADONE UR QL SCN: NEGATIVE NG/ML
MONOCYTES # BLD AUTO: 0.8 X10E3/UL (ref 0.1–0.9)
MONOCYTES NFR BLD AUTO: 7 %
NEUTROPHILS # BLD AUTO: 8.3 X10E3/UL (ref 1.4–7)
NEUTROPHILS NFR BLD AUTO: 70 %
NITRITE UR QL STRIP: NEGATIVE
OPIATES UR QL SCN: NEGATIVE NG/ML
OXYCODONE+OXYMORPHONE UR QL SCN: NEGATIVE NG/ML
PCP UR QL: NEGATIVE NG/ML
PH UR STRIP: 6 [PH] (ref 5–7.5)
PH UR: 6 [PH] (ref 4.5–8.9)
PLATELET # BLD AUTO: 236 X10E3/UL (ref 150–450)
PROPOXYPH UR QL SCN: NEGATIVE NG/ML
PROT UR QL STRIP: NEGATIVE
RBC # BLD AUTO: 4.56 X10E6/UL (ref 3.77–5.28)
RH BLD: NEGATIVE
RPR SER QL: NON REACTIVE
RUBV IGG SERPL IA-ACNC: 3.16 INDEX
SP GR UR: 1.02 (ref 1–1.03)
UROBILINOGEN UR STRIP-MCNC: 0.2 MG/DL (ref 0.2–1)
WBC # BLD AUTO: 11.9 X10E3/UL (ref 3.4–10.8)
XXX BLOOD GROUP AB TITR SERPL AHG: ABNORMAL {TITER}

## 2021-05-24 LAB — SPECIMEN STATUS: NORMAL

## 2021-05-26 ENCOUNTER — TELEPHONE (OUTPATIENT)
Dept: OBSTETRICS AND GYNECOLOGY | Facility: CLINIC | Age: 31
End: 2021-05-26

## 2021-05-26 ENCOUNTER — ROUTINE PRENATAL (OUTPATIENT)
Dept: OBSTETRICS AND GYNECOLOGY | Facility: CLINIC | Age: 31
End: 2021-05-26

## 2021-05-26 VITALS — WEIGHT: 168 LBS | DIASTOLIC BLOOD PRESSURE: 72 MMHG | SYSTOLIC BLOOD PRESSURE: 120 MMHG | BODY MASS INDEX: 26.31 KG/M2

## 2021-05-26 DIAGNOSIS — Z3A.01 LESS THAN 8 WEEKS GESTATION OF PREGNANCY: Primary | ICD-10-CM

## 2021-05-26 DIAGNOSIS — O26.899 RH NEGATIVE, ANTEPARTUM: ICD-10-CM

## 2021-05-26 DIAGNOSIS — R10.814 LEFT LOWER QUADRANT ABDOMINAL TENDERNESS WITHOUT REBOUND TENDERNESS: ICD-10-CM

## 2021-05-26 DIAGNOSIS — B96.89 BV (BACTERIAL VAGINOSIS): ICD-10-CM

## 2021-05-26 DIAGNOSIS — Z67.91 RH NEGATIVE, ANTEPARTUM: ICD-10-CM

## 2021-05-26 DIAGNOSIS — O20.0 THREATENED ABORTION: ICD-10-CM

## 2021-05-26 DIAGNOSIS — O34.219 HISTORY OF CESAREAN SECTION COMPLICATING PREGNANCY: ICD-10-CM

## 2021-05-26 DIAGNOSIS — O20.0 THREATENED MISCARRIAGE IN EARLY PREGNANCY: Primary | ICD-10-CM

## 2021-05-26 DIAGNOSIS — O20.0 THREATENED MISCARRIAGE IN EARLY PREGNANCY: ICD-10-CM

## 2021-05-26 DIAGNOSIS — N76.0 BV (BACTERIAL VAGINOSIS): ICD-10-CM

## 2021-05-26 DIAGNOSIS — R10.2 PELVIC PAIN: ICD-10-CM

## 2021-05-26 LAB
A VAGINAE DNA VAG QL NAA+PROBE: ABNORMAL SCORE
BVAB2 DNA VAG QL NAA+PROBE: ABNORMAL SCORE
C TRACH RRNA SPEC QL NAA+PROBE: NEGATIVE
GLUCOSE UR STRIP-MCNC: NEGATIVE MG/DL
MEGA1 DNA VAG QL NAA+PROBE: ABNORMAL SCORE
N GONORRHOEA RRNA SPEC QL NAA+PROBE: NEGATIVE
PROT UR STRIP-MCNC: NEGATIVE MG/DL
T VAGINALIS DNA SPEC QL NAA+PROBE: NEGATIVE
WRITTEN AUTHORIZATION: NORMAL

## 2021-05-26 PROCEDURE — 99213 OFFICE O/P EST LOW 20 MIN: CPT | Performed by: OBSTETRICS & GYNECOLOGY

## 2021-05-26 PROCEDURE — 76817 TRANSVAGINAL US OBSTETRIC: CPT | Performed by: OBSTETRICS & GYNECOLOGY

## 2021-05-26 NOTE — PROGRESS NOTES
OB FOLLOW UP  CC- Here for care of pregnancy        Cooper Bah is a 31 y.o.  7w2d patient being seen today for her obstetrical follow up visit. Patient reports bleeding, cramping.  Patient states she again had heavy bright red bleeding starting on Monday.    Her prenatal care is complicated by (and status) :    Patient Active Problem List   Diagnosis   • Hematemesis with nausea   • Weight loss   • Abdominal pain   • Liver mass   • Focal nodular hyperplasia of liver   • Pregnancy   • Rh negative, antepartum   • Screening for cervical cancer   • Endometriosis   • History of  section complicating pregnancy   • Pelvic pain   • Threatened    • BV (bacterial vaginosis)       Flu Status: Already given in current flu season  Ultrasound Today: Yes.  Findings showed viable IUP 7-1/7 weeks size; no MEDINA seen.  The pregnancy is implanted low in the uterus and close to the prior  scar..  I have personally evaluated the U/S and agree with the findings. Nico Ward MD    ROS -   Patient Reports : Cramping and bleeding  Patient Denies: Loss of Fluid, Vision Changes, Headaches, Nausea , Vomiting , Contractions and Epigastric pain  Fetal Movement : not yet  All other systems reviewed and are negative.       The additional following portions of the patient's history were reviewed and updated as appropriate: allergies, current medications, past family history, past medical history, past social history, past surgical history and problem list.    I have reviewed and agree with the HPI, ROS, and historical information as entered above. Nico Ward MD    /72   Wt 76.2 kg (168 lb)   LMP 2021 (Approximate)   BMI 26.31 kg/m²       EXAM:     FHT: 142 BPM   Uterine Size: Not examined  Pelvic Exam: No    Urine glucose/protein: See prenatal flowsheet       Assessment and Plan    Problem List Items Addressed This Visit            Pregnancy - Primary    Overview     Received  RhoGam in ER for bleeding on 2021.  Dates by 6 to 7-week ultrasound.  History of prior  for breech.  Discussed option of .  Persistent pelvic cramping.         Relevant Orders    POC Urinalysis Dipstick (Completed)    Rh negative, antepartum    Overview     Maternal blood type A negative   2021 RhoGam in ER for threatened AB         History of  section complicating pregnancy    Overview     Prior C section for Breech; ; [place].   Discussed option of .  Patient does have a prior history of pelvic fracture.         Pelvic pain    Overview      Midline severe cramping.   Started one week prior to missed menses.         Threatened     Overview     Positive episodes of heavy bleeding and severe midline cramping.  Cramping started prior to the onset of labor.  Ultrasound with no evidence of subchorionic hemorrhage.         Relevant Orders    US Ob Transvaginal    BV (bacterial vaginosis)    Overview     On prenatal labs.  Complaints of vaginal odor.  Has prescription for Flagyl to take after 10 to 12 weeks gestation.               1. Pregnancy at 7w2d; complaining of bright red bleeding since Monday.  Continues to have midline cramping.  Mild tenderness in the left lower quadrant.  2. Ultrasound with viable IUP.  No evidence of Nadir.  Discussed concern regarding possible pregnancy implantation into  scar.  Will consult with [facility].  3. Activity and Exercise discussed.  Off work until bleeding resolved.  No lifting over 20 pounds or intercourse until no bleeding for 2 weeks.  No follow-ups on file.    Nico Ward MD  2021

## 2021-05-26 NOTE — TELEPHONE ENCOUNTER
Voicemail    Patient 'having issues'. No details provided. OB Patient of MALACHI Ward.    Call: [phone]

## 2021-05-26 NOTE — TELEPHONE ENCOUNTER
Patient states she is currently experiencing bleeding, described as bright red with cramping. Started Monday. With odor. Was in [place]; was in [place] for vacation with family, after MD permission.    Pounding headache today with nausea and cramping. Dark pink color.    Appointment for U/S at 4pm and Sylvia at 4:30pm

## 2021-05-27 ENCOUNTER — OFFICE VISIT (OUTPATIENT)
Dept: OBSTETRICS AND GYNECOLOGY | Facility: HOSPITAL | Age: 31
End: 2021-05-27

## 2021-05-27 ENCOUNTER — HOSPITAL ENCOUNTER (OUTPATIENT)
Dept: WOMENS IMAGING | Facility: HOSPITAL | Age: 31
Discharge: HOME | End: 2021-05-27

## 2021-05-27 ENCOUNTER — LAB (OUTPATIENT)
Dept: LAB | Facility: HOSPITAL | Age: 31
End: 2021-05-27

## 2021-05-27 VITALS — SYSTOLIC BLOOD PRESSURE: 122 MMHG | DIASTOLIC BLOOD PRESSURE: 77 MMHG | BODY MASS INDEX: 26.06 KG/M2 | WEIGHT: 166.4 LBS

## 2021-05-27 DIAGNOSIS — O34.219 HISTORY OF CESAREAN SECTION COMPLICATING PREGNANCY: ICD-10-CM

## 2021-05-27 DIAGNOSIS — R10.814 LEFT LOWER QUADRANT ABDOMINAL TENDERNESS WITHOUT REBOUND TENDERNESS: Primary | ICD-10-CM

## 2021-05-27 DIAGNOSIS — Z67.91 RH NEGATIVE, ANTEPARTUM: ICD-10-CM

## 2021-05-27 DIAGNOSIS — O26.899 RH NEGATIVE, ANTEPARTUM: ICD-10-CM

## 2021-05-27 LAB
HCG INTACT+B SERPL-ACNC: NORMAL MIU/ML
PROGEST SERPL-MCNC: 21.6 NG/ML

## 2021-05-27 PROCEDURE — 36415 COLL VENOUS BLD VENIPUNCTURE: CPT | Performed by: OBSTETRICS & GYNECOLOGY

## 2021-05-27 PROCEDURE — 84144 ASSAY OF PROGESTERONE: CPT | Performed by: OBSTETRICS & GYNECOLOGY

## 2021-05-27 PROCEDURE — 76817 TRANSVAGINAL US OBSTETRIC: CPT

## 2021-05-27 PROCEDURE — 76817 TRANSVAGINAL US OBSTETRIC: CPT | Performed by: OBSTETRICS & GYNECOLOGY

## 2021-05-27 PROCEDURE — 84702 CHORIONIC GONADOTROPIN TEST: CPT | Performed by: OBSTETRICS & GYNECOLOGY

## 2021-05-27 NOTE — PROGRESS NOTES
Documentation of the ultasound findings, images, and interpretations will be available in the patient's Viewpoint report located in the Chart Review Imaging tab in [employer].

## 2021-05-28 ENCOUNTER — TELEPHONE (OUTPATIENT)
Dept: WOMENS IMAGING | Facility: HOSPITAL | Age: 31
End: 2021-05-28

## 2021-05-28 PROBLEM — O41.8X10 SUBCHORIONIC HEMATOMA IN FIRST TRIMESTER: Status: ACTIVE | Noted: 2021-05-28

## 2021-05-28 PROBLEM — O46.8X1 SUBCHORIONIC HEMATOMA IN FIRST TRIMESTER: Status: ACTIVE | Noted: 2021-05-28

## 2021-05-28 NOTE — TELEPHONE ENCOUNTER
----- Message from Nico Bradford MD sent at 5/28/2021  8:34 AM EDT -----  Please notify patient of these normal results. Needs ultrasound in 3 weeks, MSAFP at 14 weeks. Please forward the results to her referring physician.   ----- Message -----  From: Lab, Background User  Sent: 5/27/2021   7:05 PM EDT  To: Nico Bradford MD      Notified pt of normal lab results per Dr. Bradford.  Pt v/u and confirms that she does have f/u appt at [facility] in 3 weeks on 6/17/21.

## 2021-06-09 ENCOUNTER — ROUTINE PRENATAL (OUTPATIENT)
Dept: OBSTETRICS AND GYNECOLOGY | Facility: CLINIC | Age: 31
End: 2021-06-09

## 2021-06-09 VITALS — DIASTOLIC BLOOD PRESSURE: 80 MMHG | SYSTOLIC BLOOD PRESSURE: 118 MMHG | WEIGHT: 166.2 LBS | BODY MASS INDEX: 26.03 KG/M2

## 2021-06-09 DIAGNOSIS — O46.8X1 SUBCHORIONIC HEMATOMA IN FIRST TRIMESTER, SINGLE OR UNSPECIFIED FETUS: ICD-10-CM

## 2021-06-09 DIAGNOSIS — Z67.91 RH NEGATIVE, ANTEPARTUM: ICD-10-CM

## 2021-06-09 DIAGNOSIS — B96.89 BV (BACTERIAL VAGINOSIS): ICD-10-CM

## 2021-06-09 DIAGNOSIS — R10.2 PELVIC PAIN: ICD-10-CM

## 2021-06-09 DIAGNOSIS — O34.219 HISTORY OF CESAREAN SECTION COMPLICATING PREGNANCY: ICD-10-CM

## 2021-06-09 DIAGNOSIS — Z3A.09 9 WEEKS GESTATION OF PREGNANCY: Primary | ICD-10-CM

## 2021-06-09 DIAGNOSIS — O20.0 THREATENED ABORTION: ICD-10-CM

## 2021-06-09 DIAGNOSIS — O41.8X10 SUBCHORIONIC HEMATOMA IN FIRST TRIMESTER, SINGLE OR UNSPECIFIED FETUS: ICD-10-CM

## 2021-06-09 DIAGNOSIS — O26.899 RH NEGATIVE, ANTEPARTUM: ICD-10-CM

## 2021-06-09 DIAGNOSIS — R10.814 LEFT LOWER QUADRANT ABDOMINAL TENDERNESS WITHOUT REBOUND TENDERNESS: ICD-10-CM

## 2021-06-09 DIAGNOSIS — N76.0 BV (BACTERIAL VAGINOSIS): ICD-10-CM

## 2021-06-09 LAB
GLUCOSE UR STRIP-MCNC: NEGATIVE MG/DL
PROT UR STRIP-MCNC: NEGATIVE MG/DL

## 2021-06-09 PROCEDURE — 99213 OFFICE O/P EST LOW 20 MIN: CPT | Performed by: OBSTETRICS & GYNECOLOGY

## 2021-06-09 NOTE — PROGRESS NOTES
OB FOLLOW UP  CC- Here for care of pregnancy        Cooper Bah is a 31 y.o.  9w2d patient being seen today for her obstetrical follow up visit. Patient reports heartburn.  Patient states that she feels well.  Has any nausea or vomiting.  Said no further bleeding for almost 1 week.  She states the bleeding and cramping and pelvic pain have completely resolved.  She has had some brown spotting last 2 days.    Her prenatal care is complicated by (and status) :    Patient Active Problem List   Diagnosis   • Hematemesis with nausea   • Weight loss   • Abdominal pain   • Liver mass   • Focal nodular hyperplasia of liver   • Pregnancy   • Rh negative, antepartum   • Screening for cervical cancer   • Endometriosis   • History of  section complicating pregnancy   • Pelvic pain   • Threatened    • BV (bacterial vaginosis)   • Abdominal tenderness of left lower quadrant   • Subchorionic hematoma in first trimester   • Morbidly adherent placenta, antepartum       Flu Status: Already given in current flu season  Ultrasound Today: Yes.  Findings showed viable IUP 9 weeks and 2 days in size.  Small Nadir is seen at the inferior gestational sac.  Myometrial thickness is 2.6 mm at thinnest part near the  scar..  I have personally evaluated the U/S and agree with the findings. Nico Ward MD    ROS -   Patient Reports : No Problems  Patient Denies: Loss of Fluid, Vaginal Spotting, Vision Changes, Headaches, Nausea , Vomiting , Contractions and Epigastric pain  Fetal Movement : decreased  All other systems reviewed and are negative.       The additional following portions of the patient's history were reviewed and updated as appropriate: allergies, current medications, past family history, past medical history, past social history, past surgical history and problem list.    I have reviewed and agree with the HPI, ROS, and historical information as entered above. Nico Ward MD    BP  118/80   Wt 75.4 kg (166 lb 3.2 oz)   LMP 2021 (Approximate)   BMI 26.03 kg/m²       EXAM:     FHT: 163 BPM   Uterine Size: Not examined  Pelvic Exam: No    Urine glucose/protein: See prenatal flowsheet       Assessment and Plan    Problem List Items Addressed This Visit            Pregnancy - Primary    Overview     Received RhoGam in ER for bleeding on 2021.  Dates by 6 to 7-week ultrasound.  History of prior  for breech.  Discussed option of .  Persistent pelvic cramping.  Wants cell free DNA.   Need AFP 4 at 16 wk.          Relevant Orders    POC Urinalysis Dipstick (Completed)    Rh negative, antepartum    Overview     Maternal blood type A negative   2021 RhoGam in ER for threatened AB         History of  section complicating pregnancy    Overview     Prior C section for Breech; ; [place].   Discussed option of .  Patient does have a prior history of pelvic fracture.         Pelvic pain    Overview      Midline severe cramping.   Started one week prior to missed menses.         Threatened     Overview     Positive episodes of heavy bleeding and severe midline cramping.  Cramping started prior to the onset of labor.  [facility] Ultrasound 21 with subchorionic hemorrhage.         BV (bacterial vaginosis)    Overview     On prenatal labs.  Complaints of vaginal odor.  Has prescription for Flagyl to take after 10 to 12 weeks gestation.         Abdominal tenderness of left lower quadrant    Overview     Mild low left pelvic tenderness.          Subchorionic hematoma in first trimester    Overview     [facility] ultrasound 2021.  Concern for morbidly adherent placenta         Morbidly adherent placenta, antepartum    Overview     Concern for c section scar implantation.                1. Pregnancy at 9w2d; bleeding and pelvic pain and cramping have resolved since .  2. Large subchorionic hemorrhage noted at [facility] ultrasound 2021.  Small Nadir noted  today.  3. Follow-up with [facility] 1 week; discussed concern over morbidly adherent placenta.  4. Fetal status reassuring.  Desires cell free DNA testing at 12 weeks.  AFP at 16 weeks  5. Activity and Exercise discussed.  Return in about 2 weeks (around 6/23/2021) for Follow-up ultrasound.    Nico Ward MD  06/09/2021

## 2021-06-17 ENCOUNTER — OFFICE VISIT (OUTPATIENT)
Dept: OBSTETRICS AND GYNECOLOGY | Facility: HOSPITAL | Age: 31
End: 2021-06-17

## 2021-06-17 ENCOUNTER — HOSPITAL ENCOUNTER (OUTPATIENT)
Dept: WOMENS IMAGING | Facility: HOSPITAL | Age: 31
Discharge: HOME | End: 2021-06-17
Admitting: OBSTETRICS & GYNECOLOGY

## 2021-06-17 VITALS — SYSTOLIC BLOOD PRESSURE: 113 MMHG | BODY MASS INDEX: 26.53 KG/M2 | DIASTOLIC BLOOD PRESSURE: 68 MMHG | WEIGHT: 169.4 LBS

## 2021-06-17 DIAGNOSIS — O46.8X1 SUBCHORIONIC HEMATOMA IN FIRST TRIMESTER, SINGLE OR UNSPECIFIED FETUS: Primary | ICD-10-CM

## 2021-06-17 DIAGNOSIS — O41.8X10 SUBCHORIONIC HEMATOMA IN FIRST TRIMESTER, SINGLE OR UNSPECIFIED FETUS: Primary | ICD-10-CM

## 2021-06-17 DIAGNOSIS — Z67.91 RH NEGATIVE, ANTEPARTUM: ICD-10-CM

## 2021-06-17 DIAGNOSIS — O34.219 HISTORY OF CESAREAN SECTION COMPLICATING PREGNANCY: ICD-10-CM

## 2021-06-17 DIAGNOSIS — O26.899 RH NEGATIVE, ANTEPARTUM: ICD-10-CM

## 2021-06-17 DIAGNOSIS — R10.814 LEFT LOWER QUADRANT ABDOMINAL TENDERNESS WITHOUT REBOUND TENDERNESS: ICD-10-CM

## 2021-06-17 PROCEDURE — 76817 TRANSVAGINAL US OBSTETRIC: CPT | Performed by: OBSTETRICS & GYNECOLOGY

## 2021-06-17 PROCEDURE — 76817 TRANSVAGINAL US OBSTETRIC: CPT

## 2021-06-17 PROCEDURE — 99212 OFFICE O/P EST SF 10 MIN: CPT | Performed by: OBSTETRICS & GYNECOLOGY

## 2021-06-23 ENCOUNTER — ROUTINE PRENATAL (OUTPATIENT)
Dept: OBSTETRICS AND GYNECOLOGY | Facility: CLINIC | Age: 31
End: 2021-06-23

## 2021-06-23 VITALS — BODY MASS INDEX: 26.72 KG/M2 | WEIGHT: 170.6 LBS | DIASTOLIC BLOOD PRESSURE: 62 MMHG | SYSTOLIC BLOOD PRESSURE: 105 MMHG

## 2021-06-23 DIAGNOSIS — O20.0 THREATENED ABORTION: ICD-10-CM

## 2021-06-23 DIAGNOSIS — N76.0 BV (BACTERIAL VAGINOSIS): ICD-10-CM

## 2021-06-23 DIAGNOSIS — O46.8X1 SUBCHORIONIC HEMATOMA IN FIRST TRIMESTER, SINGLE OR UNSPECIFIED FETUS: ICD-10-CM

## 2021-06-23 DIAGNOSIS — R10.2 PELVIC PAIN: ICD-10-CM

## 2021-06-23 DIAGNOSIS — O41.8X10 SUBCHORIONIC HEMATOMA IN FIRST TRIMESTER, SINGLE OR UNSPECIFIED FETUS: ICD-10-CM

## 2021-06-23 DIAGNOSIS — Z67.91 RH NEGATIVE, ANTEPARTUM: ICD-10-CM

## 2021-06-23 DIAGNOSIS — Z3A.11 11 WEEKS GESTATION OF PREGNANCY: Primary | ICD-10-CM

## 2021-06-23 DIAGNOSIS — O34.219 HISTORY OF CESAREAN SECTION COMPLICATING PREGNANCY: ICD-10-CM

## 2021-06-23 DIAGNOSIS — B96.89 BV (BACTERIAL VAGINOSIS): ICD-10-CM

## 2021-06-23 DIAGNOSIS — O26.899 RH NEGATIVE, ANTEPARTUM: ICD-10-CM

## 2021-06-23 LAB
GLUCOSE UR STRIP-MCNC: NEGATIVE MG/DL
PROT UR STRIP-MCNC: NEGATIVE MG/DL

## 2021-06-23 PROCEDURE — 99213 OFFICE O/P EST LOW 20 MIN: CPT | Performed by: OBSTETRICS & GYNECOLOGY

## 2021-06-23 NOTE — PROGRESS NOTES
OB FOLLOW UP  CC- Here for care of pregnancy        Cooper Bah is a 31 y.o.  11w2d patient being seen today for her obstetrical follow up visit. Patient reports no complaints.  Patient denies any vaginal bleeding.  She is having some mild cramping.  Denies any severe abdominal pain or tenderness.    Patient had a [facility] ultrasound performed last week which had a viable pregnancy with resolution of the subchorionic hemorrhage.  There is concern over an accreta's syndrome with 5 mm of myometrium present in the lower segment of the uterus.  Have a repeat ultrasound scheduled for 4 weeks.    Patient desires to do cell free DNA screening today.  Do AFP only screening at 16 weeks.    Her prenatal care is complicated by (and status) :    Patient Active Problem List   Diagnosis   • Hematemesis with nausea   • Weight loss   • Abdominal pain   • Liver mass   • Focal nodular hyperplasia of liver   • Pregnancy   • Rh negative, antepartum   • Screening for cervical cancer   • Endometriosis   • History of  section complicating pregnancy   • Pelvic pain   • Threatened    • BV (bacterial vaginosis)   • Abdominal tenderness of left lower quadrant   • Subchorionic hematoma in first trimester   • Morbidly adherent placenta, antepartum       Flu Status: Already given in current flu season  Ultrasound Today: Yes.  Findings showed viable IUP; placenta previa; .  I have personally evaluated the U/S and agree with the findings. Nico Ward MD    ROS -   Patient Reports : No Problems  Patient Denies: Loss of Fluid, Vaginal Spotting, Vision Changes, Headaches, Nausea , Vomiting , Contractions and Epigastric pain  Fetal Movement : Too early  All other systems reviewed and are negative.       The additional following portions of the patient's history were reviewed and updated as appropriate: allergies, current medications, past family history, past medical history, past social history, past surgical history and  problem list.    I have reviewed and agree with the HPI, ROS, and historical information as entered above. Nico Ward MD    /62   Wt 77.4 kg (170 lb 9.6 oz)   LMP 2021 (Approximate)   BMI 26.72 kg/m²       EXAM:     FHT: 171 BPM   Uterine Size: size equals dates  Pelvic Exam: No    Urine glucose/protein: See prenatal flowsheet       Assessment and Plan    Problem List Items Addressed This Visit            Pregnancy - Primary    Overview     Received RhoGam in ER for bleeding on 2021.  Dates by 6 to 7-week ultrasound.  History of prior  for breech.  Discussed option of .  Persistent pelvic cramping.  Wants cell free DNA.   Need AFP 4 at 16 wk.          Relevant Orders    POC Urinalysis Dipstick (Completed)    [id number] PLUS Core+SCA+ESS - Blood,    Rh negative, antepartum    Overview     Maternal blood type A negative   2021 RhoGam in ER for threatened AB         History of  section complicating pregnancy    Overview     Prior C section for Breech; ; [place].   Discussed option of .  Patient does have a prior history of pelvic fracture.         Pelvic pain    Overview      Midline severe cramping.   Started one week prior to missed menses.         Threatened     Overview     Positive episodes of heavy bleeding and severe midline cramping.  Cramping started prior to the onset of labor.  [facility] Ultrasound 21 with subchorionic hemorrhage.         BV (bacterial vaginosis)    Overview     On prenatal labs.  Complaints of vaginal odor.  Has prescription for Flagyl to take after 10 to 12 weeks gestation.         Subchorionic hematoma in first trimester    Overview     [facility] ultrasound 2021.  Concern for morbidly adherent placenta.   21; MEDINA resolved.          Morbidly adherent placenta, antepartum    Overview      Concern for c section scar implantation.    [facility] 21; Placenta previa; anterior. Myometrial thickness 5 mm; watch for PAS (  Placenta Accreta Syndrome)               1. Pregnancy at 11w2d; Viable IUP on u/s. [facility] US 6/17/21; Placenta previa; anterior. Myometrial thickness 5 mm; watch for PAS ( Placenta Accreta Syndrome)  2. Activity and Exercise discussed.  Patient requests cell free DNA screen today.  We will check AFP only at 16 weeks.  3. MEDINA resolved on u/s today.  Has follow-up [facility] ultrasound scheduled for 7/15/2021  Return in about 4 weeks (around 7/21/2021) for Next scheduled follow up.    Nico Ward MD  06/23/2021

## 2021-07-08 ENCOUNTER — TELEPHONE (OUTPATIENT)
Dept: OBSTETRICS AND GYNECOLOGY | Facility: CLINIC | Age: 31
End: 2021-07-08

## 2021-07-08 ENCOUNTER — HOSPITAL ENCOUNTER (EMERGENCY)
Facility: HOSPITAL | Age: 31
Discharge: HOME | End: 2021-07-08
Attending: EMERGENCY MEDICINE | Admitting: EMERGENCY MEDICINE

## 2021-07-08 VITALS
DIASTOLIC BLOOD PRESSURE: 92 MMHG | RESPIRATION RATE: 16 BRPM | OXYGEN SATURATION: 98 % | HEIGHT: 67 IN | HEART RATE: 85 BPM | SYSTOLIC BLOOD PRESSURE: 113 MMHG | BODY MASS INDEX: 27.31 KG/M2 | TEMPERATURE: 98.8 F | WEIGHT: 174 LBS

## 2021-07-08 DIAGNOSIS — O20.0 THREATENED MISCARRIAGE: Primary | ICD-10-CM

## 2021-07-08 LAB
ABO GROUP BLD: NORMAL
ANTI-D, PASSIVE: NORMAL
BASOPHILS # BLD AUTO: 0.03 10*3/MM3 (ref 0–0.2)
BASOPHILS NFR BLD AUTO: 0.3 % (ref 0–1.5)
BLD GP AB SCN SERPL QL: POSITIVE
DEPRECATED RDW RBC AUTO: 39.6 FL (ref 37–54)
EOSINOPHIL # BLD AUTO: 0.07 10*3/MM3 (ref 0–0.4)
EOSINOPHIL NFR BLD AUTO: 0.7 % (ref 0.3–6.2)
ERYTHROCYTE [DISTWIDTH] IN BLOOD BY AUTOMATED COUNT: 11.9 % (ref 12.3–15.4)
HCG INTACT+B SERPL-ACNC: NORMAL MIU/ML
HCT VFR BLD AUTO: 37.3 % (ref 34–46.6)
HGB BLD-MCNC: 12.9 G/DL (ref 12–15.9)
HOLD SPECIMEN: NORMAL
IMM GRANULOCYTES # BLD AUTO: 0.06 10*3/MM3 (ref 0–0.05)
IMM GRANULOCYTES NFR BLD AUTO: 0.6 % (ref 0–0.5)
LYMPHOCYTES # BLD AUTO: 2.47 10*3/MM3 (ref 0.7–3.1)
LYMPHOCYTES NFR BLD AUTO: 23.7 % (ref 19.6–45.3)
MCH RBC QN AUTO: 31.5 PG (ref 26.6–33)
MCHC RBC AUTO-ENTMCNC: 34.6 G/DL (ref 31.5–35.7)
MCV RBC AUTO: 91.2 FL (ref 79–97)
MONOCYTES # BLD AUTO: 0.55 10*3/MM3 (ref 0.1–0.9)
MONOCYTES NFR BLD AUTO: 5.3 % (ref 5–12)
NEUTROPHILS NFR BLD AUTO: 69.4 % (ref 42.7–76)
NEUTROPHILS NFR BLD AUTO: 7.24 10*3/MM3 (ref 1.7–7)
NRBC BLD AUTO-RTO: 0 /100 WBC (ref 0–0.2)
NUMBER OF DOSES: NORMAL
PLATELET # BLD AUTO: 238 10*3/MM3 (ref 140–450)
PMV BLD AUTO: 10 FL (ref 6–12)
RBC # BLD AUTO: 4.09 10*6/MM3 (ref 3.77–5.28)
RH BLD: NEGATIVE
WBC # BLD AUTO: 10.42 10*3/MM3 (ref 3.4–10.8)
WHOLE BLOOD HOLD SPECIMEN: NORMAL

## 2021-07-08 PROCEDURE — 84702 CHORIONIC GONADOTROPIN TEST: CPT | Performed by: EMERGENCY MEDICINE

## 2021-07-08 PROCEDURE — 85025 COMPLETE CBC W/AUTO DIFF WBC: CPT | Performed by: EMERGENCY MEDICINE

## 2021-07-08 PROCEDURE — 86850 RBC ANTIBODY SCREEN: CPT | Performed by: EMERGENCY MEDICINE

## 2021-07-08 PROCEDURE — 99283 EMERGENCY DEPT VISIT LOW MDM: CPT

## 2021-07-08 PROCEDURE — 86900 BLOOD TYPING SEROLOGIC ABO: CPT | Performed by: EMERGENCY MEDICINE

## 2021-07-08 PROCEDURE — 86901 BLOOD TYPING SEROLOGIC RH(D): CPT | Performed by: EMERGENCY MEDICINE

## 2021-07-08 PROCEDURE — 86870 RBC ANTIBODY IDENTIFICATION: CPT | Performed by: EMERGENCY MEDICINE

## 2021-07-08 RX ORDER — SODIUM CHLORIDE 0.9 % (FLUSH) 0.9 %
10 SYRINGE (ML) INJECTION AS NEEDED
Status: DISCONTINUED | OUTPATIENT
Start: 2021-07-08 | End: 2021-07-08 | Stop reason: HOSPADM

## 2021-07-08 NOTE — TELEPHONE ENCOUNTER
Spoke to the patient. After consulting with ZAK Tijerina she recommended that the patient continue to monitor her s/s, she needs to lay down and rest, no heavy lifting, no IC, pelvic rest is a must. She is to call back if she has anymore bleeding. If it is after hours she is to call the doc on call. Appt with [facility] on 07/15/2021.

## 2021-07-08 NOTE — ED PROVIDER NOTES
"Subjective   31-year-old female presents for evaluation of vaginal bleeding.  Of note, the patient is a G2, P1 at approximately 13 weeks gestation.  She is followed by Dr. Ward of OB/GYN.  She states that this afternoon at approximately 2 PM she began experiencing some mild lower abdominal cramping followed by vaginal bleeding.  She notes both bright red blood and passage of clots as well.  She states that she \"bled through 2 pads.\"  She was concerned about a potential miscarriage and subsequently came to the emergency department to be evaluated.  She denies any current abdominal pain.  Of note, the patient has a known placenta previa.  Additionally, she is Rh- and had RhoGam in May of this year.          Review of Systems   Genitourinary: Positive for vaginal bleeding.   All other systems reviewed and are negative.      Past Medical History:   Diagnosis Date   • Acid reflux    • Anxiety     since age 24   • History of 2019 novel coronavirus disease (COVID-19) 2020   • History of endometriosis 2006   • Liver mass     benign mass; followed by Dr. Milton   • PONV (postoperative nausea and vomiting)    • Spitz nevus of forearm, right 2020    malignant; clear margins       No Known Allergies    Past Surgical History:   Procedure Laterality Date   •  SECTION     • COLONOSCOPY  2012   • DIAGNOSTIC LAPAROSCOPY  2006    endometriosis   • EAR TUBES     • ENDOSCOPY  2012    acid reflux   • ENDOSCOPY N/A 2020    Procedure: ESOPHAGOGASTRODUODENOSCOPY;  Surgeon: Cm Echeverria MD;  Location: [facility] ENDOSCOPY;  Service: Gastroenterology;  Laterality: N/A;  esophageal dilation to 20    • OTHER SURGICAL HISTORY  2020    Patient had a procedure on her lower right arm for an atypical spitz nevus tumor biopsy and removal.        Family History   Problem Relation Age of Onset   • Arthritis Mother    • Asthma Mother    • Cancer Mother    • COPD Mother    • Depression Mother    • " Diabetes Mother    • Hyperlipidemia Mother    • Hypertension Mother    • Mental illness Mother    • Miscarriages / Stillbirths Mother    • Colon cancer Mother    • Arthritis Father    • Cancer Father    • Hyperlipidemia Father    • Arthritis Brother    • Asthma Brother    • Diabetes Brother    • Cancer Maternal Aunt    • Early death Maternal Aunt    • Cancer Maternal Grandmother    • COPD Maternal Grandmother    • Early death Maternal Grandfather    • Heart disease Maternal Grandfather    • Hyperlipidemia Maternal Grandfather    • Hypertension Maternal Grandfather        Social History     Socioeconomic History   • Marital status: Single     Spouse name: Not on file   • Number of children: Not on file   • Years of education: Not on file   • Highest education level: Not on file   Tobacco Use   • Smoking status: Current Every Day Smoker     Packs/day: 0.75     Types: Cigarettes   • Smokeless tobacco: Never Used   Vaping Use   • Vaping Use: Former   Substance and Sexual Activity   • Alcohol use: Not Currently     Comment: 3-4/week when not pregnant   • Drug use: Not Currently     Types: Marijuana     Comment: in high school   • Sexual activity: Defer     Birth control/protection: None           Objective   Physical Exam  Vitals and nursing note reviewed.   Constitutional:       General: She is not in acute distress.     Appearance: Normal appearance. She is well-developed. She is not diaphoretic.      Comments: Nontoxic-appearing female   HENT:      Head: Normocephalic and atraumatic.   Eyes:      Pupils: Pupils are equal, round, and reactive to light.   Cardiovascular:      Rate and Rhythm: Normal rate and regular rhythm.      Heart sounds: Normal heart sounds. No murmur heard.   No friction rub. No gallop.    Pulmonary:      Effort: Pulmonary effort is normal. No respiratory distress.      Breath sounds: Normal breath sounds. No wheezing or rales.   Abdominal:      General: Bowel sounds are normal. There is no  distension.      Palpations: Abdomen is soft. There is no mass.      Tenderness: There is no abdominal tenderness. There is no guarding or rebound.      Comments: No focal abdominal tenderness, no peritoneal signs, no pain out of proportion to exam   Genitourinary:     Comments: No CVA tenderness present  Musculoskeletal:         General: Normal range of motion.   Skin:     General: Skin is warm and dry.      Findings: No erythema or rash.   Neurological:      General: No focal deficit present.      Mental Status: She is alert and oriented to person, place, and time.   Psychiatric:         Mood and Affect: Mood normal.         Thought Content: Thought content normal.         Judgment: Judgment normal.         Procedures           ED Course  ED Course as of Jul 08 1954   Thu Jul 08, 2021 1952 31-year-old female presents for evaluation of vaginal bleeding.  Of note, the patient is a G2, P1 at approximately 13 weeks gestation.  She is followed by Dr. Ward of OB/GYN.  She has a known placenta previa.  This afternoon just after approximately 2 PM, she was in the shower when she began experiencing vaginal bleeding and noted some passage of clots, prompting her visit to the emergency department tonight as she was concern for potential miscarriage.  On arrival, the patient is nontoxic-appearing.  Nonsurgical abdomen.  No focal abdominal tenderness present.  Bedside ultrasound revealed a viable intrauterine pregnancy consistent with stated gestational age with good fetal movement and a fetal heart rate of 154.  Hemoglobin is normal.  The patient is Rh- and received RhoGam back in May.  Patient reassured.  She will follow-up with Dr. Ward within the next week.  Agreeable with plan and given appropriate strict return precautions.    [DD]      ED Course User Index  [DD] Karan Araujo MD                                 Recent Results (from the past 24 hour(s))   hCG, Quantitative, Pregnancy    Collection Time:  21  6:35 PM    Specimen: Blood   Result Value Ref Range    HCG Quantitative 32,296.00 mIU/mL   Green Top (Gel)    Collection Time: 21  6:35 PM   Result Value Ref Range    Extra Tube Hold for add-ons.    Lavender Top    Collection Time: 21  6:35 PM   Result Value Ref Range    Extra Tube hold for add-on    Gold Top - SST    Collection Time: 21  6:35 PM   Result Value Ref Range    Extra Tube Hold for add-ons.    CBC Auto Differential    Collection Time: 21  6:35 PM    Specimen: Blood   Result Value Ref Range    WBC 10.42 3.40 - 10.80 10*3/mm3    RBC 4.09 3.77 - 5.28 10*6/mm3    Hemoglobin 12.9 12.0 - 15.9 g/dL    Hematocrit 37.3 34.0 - 46.6 %    MCV 91.2 79.0 - 97.0 fL    MCH 31.5 26.6 - 33.0 pg    MCHC 34.6 31.5 - 35.7 g/dL    RDW 11.9 (L) 12.3 - 15.4 %    RDW-SD 39.6 37.0 - 54.0 fl    MPV 10.0 6.0 - 12.0 fL    Platelets 238 140 - 450 10*3/mm3    Neutrophil % 69.4 42.7 - 76.0 %    Lymphocyte % 23.7 19.6 - 45.3 %    Monocyte % 5.3 5.0 - 12.0 %    Eosinophil % 0.7 0.3 - 6.2 %    Basophil % 0.3 0.0 - 1.5 %    Immature Grans % 0.6 (H) 0.0 - 0.5 %    Neutrophils, Absolute 7.24 (H) 1.70 - 7.00 10*3/mm3    Lymphocytes, Absolute 2.47 0.70 - 3.10 10*3/mm3    Monocytes, Absolute 0.55 0.10 - 0.90 10*3/mm3    Eosinophils, Absolute 0.07 0.00 - 0.40 10*3/mm3    Basophils, Absolute 0.03 0.00 - 0.20 10*3/mm3    Immature Grans, Absolute 0.06 (H) 0.00 - 0.05 10*3/mm3    nRBC 0.0 0.0 - 0.2 /100 WBC    RhIg Evaluation    Collection Time: 21  6:53 PM    Specimen: Blood   Result Value Ref Range    ABO Type A     RH type Negative     Antibody Screen Positive    Doses of Rh Immune Globulin    Collection Time: 21  6:53 PM    Specimen: Blood   Result Value Ref Range    Number of Doses Recommend 1 vial of RhIg      Note: In addition to lab results from this visit, the labs listed above may include labs taken at another facility or during a different encounter within the last 24 hours. Please  "correlate lab times with ED admission and discharge times for further clarification of the services performed during this visit.    No orders to display     Vitals:    07/08/21 1826   BP: 113/92   BP Location: Left arm   Patient Position: Sitting   Pulse: 85   Resp: 16   Temp: 98.8 °F (37.1 °C)   TempSrc: Oral   SpO2: 98%   Weight: 78.9 kg (174 lb)   Height: 170.2 cm (67\")     Medications   sodium chloride 0.9 % flush 10 mL (has no administration in time range)     ECG/EMG Results (last 24 hours)     ** No results found for the last 24 hours. **        No orders to display                 MDM    Final diagnoses:   Threatened miscarriage       ED Disposition  ED Disposition     ED Disposition Condition Comment    Discharge Stable           Nico Ward MD  [address] [place], [place] [id number] [phone]    In 1 week           Medication List      No changes were made to your prescriptions during this visit.          Karan Araujo MD  07/08/21 2031    "

## 2021-07-08 NOTE — TELEPHONE ENCOUNTER
Patient with complete placenta previa that just had a gush of blood in the shower. She states it was like a period and has now slowed to spotting. This was thirty minutes ago. Doesn't think the water added to the volume of blood. Called [facility] first and they told her to call us.

## 2021-07-13 ENCOUNTER — ROUTINE PRENATAL (OUTPATIENT)
Dept: OBSTETRICS AND GYNECOLOGY | Facility: CLINIC | Age: 31
End: 2021-07-13

## 2021-07-13 VITALS — BODY MASS INDEX: 26.88 KG/M2 | SYSTOLIC BLOOD PRESSURE: 118 MMHG | WEIGHT: 171.6 LBS | DIASTOLIC BLOOD PRESSURE: 60 MMHG

## 2021-07-13 DIAGNOSIS — O20.0 THREATENED ABORTION: ICD-10-CM

## 2021-07-13 DIAGNOSIS — B96.89 BV (BACTERIAL VAGINOSIS): ICD-10-CM

## 2021-07-13 DIAGNOSIS — Z67.91 RH NEGATIVE, ANTEPARTUM: ICD-10-CM

## 2021-07-13 DIAGNOSIS — O46.8X1 SUBCHORIONIC HEMATOMA IN FIRST TRIMESTER, SINGLE OR UNSPECIFIED FETUS: ICD-10-CM

## 2021-07-13 DIAGNOSIS — N80.9 ENDOMETRIOSIS: ICD-10-CM

## 2021-07-13 DIAGNOSIS — Z12.4 SCREENING FOR CERVICAL CANCER: ICD-10-CM

## 2021-07-13 DIAGNOSIS — O26.899 RH NEGATIVE, ANTEPARTUM: ICD-10-CM

## 2021-07-13 DIAGNOSIS — Z3A.14 14 WEEKS GESTATION OF PREGNANCY: ICD-10-CM

## 2021-07-13 DIAGNOSIS — O41.8X10 SUBCHORIONIC HEMATOMA IN FIRST TRIMESTER, SINGLE OR UNSPECIFIED FETUS: ICD-10-CM

## 2021-07-13 DIAGNOSIS — N76.0 BV (BACTERIAL VAGINOSIS): ICD-10-CM

## 2021-07-13 DIAGNOSIS — R10.814 LEFT LOWER QUADRANT ABDOMINAL TENDERNESS WITHOUT REBOUND TENDERNESS: ICD-10-CM

## 2021-07-13 DIAGNOSIS — O34.219 HISTORY OF CESAREAN SECTION COMPLICATING PREGNANCY: ICD-10-CM

## 2021-07-13 DIAGNOSIS — R10.2 PELVIC PAIN: ICD-10-CM

## 2021-07-13 LAB
EXPIRATION DATE: NORMAL
GLUCOSE UR STRIP-MCNC: NEGATIVE MG/DL
Lab: NORMAL
PROT UR STRIP-MCNC: NEGATIVE MG/DL

## 2021-07-13 PROCEDURE — 0502F SUBSEQUENT PRENATAL CARE: CPT | Performed by: NURSE PRACTITIONER

## 2021-07-13 RX ORDER — PRENATAL VIT NO.126/IRON/FOLIC 28MG-0.8MG
TABLET ORAL DAILY
COMMUNITY

## 2021-07-13 NOTE — PROGRESS NOTES
OB FOLLOW UP  CC- Here for care of pregnancy        Cooper Bah is a 31 y.o.  14w1d patient being seen today for her obstetrical follow up visit. Patient was seen in ER 2021 for heavy bleeding with clots.  She reports that bleeding was gushing blood, and was having to change bad once every hour x 3 hours.  Clotting was present, and were quarter sized.  She denies cramping, but had significant amounts of pressure.   She says that it tapered to moderate bleeding that continued x 3 days, and has since tapered to brown discharge that is continuing.  She denies any cramping at any point since, but reports that she is still having pressure.      She works at [employer], and is on her feet for 5-6 hours daily.  She is on a 25 pound weight restriction from [facility], but reports that she is still lifting, pulling, tugging and bending constantly for the duration of time sthe is at work.       She denies intercourse since may, dysuria, vaginal discomfort/irriation.  She reports that does have a vaginal odor, but CLAIRE gave her flagyl and told her not to start until she had reached second trimester.  She says that she has just not began yet.       MBT is A negative, and rhogam was administered on 2021.  She is 8  into current span of Rhogam, and will be due again if needed on 2021.    She also reports that she has been having daily mild headaches beginning on Saturday.  Tylenol has not helped to alleviate discomfort, but she does say that she is not adequately hydrated.  Advised to push fluids.  She also says that she has been monitoring BP at home as a precaution, and it has been WNL.    She denies LOF, vision changes, swelling, abdominal cramping.  She reports occasional flutters of fetal movement.       Her prenatal care is complicated by (and status) :    Patient Active Problem List   Diagnosis   • Hematemesis with nausea   • Weight loss   • Abdominal pain   • Liver mass   • Focal nodular hyperplasia of liver    • Pregnancy   • Rh negative, antepartum   • Screening for cervical cancer   • Endometriosis   • History of  section complicating pregnancy   • Pelvic pain   • Threatened    • BV (bacterial vaginosis)   • Abdominal tenderness of left lower quadrant   • Subchorionic hematoma in first trimester   • Morbidly adherent placenta, antepartum     Ultrasound Today: No.    ROS -   Patient Reports : Vaginal Spotting, Headaches and vaginal pressure  Patient Denies: Loss of Fluid, Vision Changes, Contractions and Epigastric pain  Fetal Movement : occasional flutters  All other systems reviewed and are negative.       The additional following portions of the patient's history were reviewed and updated as appropriate: allergies, current medications, past family history, past medical history, past social history, past surgical history and problem list.    I have reviewed and agree with the HPI, ROS, and historical information as entered above. Jasmin Paez, APRN    /60   Wt 77.8 kg (171 lb 9.6 oz)   LMP 2021 (Approximate)   BMI 26.88 kg/m²       EXAM:     FHT: 150 BPM     Urine glucose/protein: See prenatal flowsheet       Assessment and Plan    Problem List Items Addressed This Visit        Gastrointestinal Abdominal     Pelvic pain    Overview      Midline severe cramping.   Started one week prior to missed menses.         Abdominal tenderness of left lower quadrant    Overview     Mild low left pelvic tenderness.             Genitourinary and Reproductive     Screening for cervical cancer    Overview     Last Pap smear in 2020.  Repeat Pap smear 6 weeks postpartum.         Endometriosis    Overview     ; diagnostic laparoscopy with treatment of endometriosis at 17 years of age for dysmenorrhea and abnormal bleeding.         BV (bacterial vaginosis)    Overview     On prenatal labs.  Complaints of vaginal odor.  Has prescription for Flagyl to take after 10 to 12 weeks gestation.             Gravid and     Pregnancy    Overview     Received RhoGam in ER for bleeding on 2021.  Dates by 6 to 7-week ultrasound.  History of prior  for breech.  Discussed option of .  Persistent pelvic cramping.  Cell Free DNA screen low risk; consistent with male  Need AFP 4 at 16 wk.          Relevant Orders    POC Protein, Urine, Qualitative, Dipstick (Completed)    POC Glucose, Urine, Qualitative, Dipstick (Completed)    Rh negative, antepartum    Overview     Maternal blood type A negative   2021 RhoGam in ER for threatened AB         History of  section complicating pregnancy    Overview     Prior C section for Breech; ; Chance.   Discussed option of .  Patient does have a prior history of pelvic fracture.         Threatened     Overview     Positive episodes of heavy bleeding and severe midline cramping.  Cramping started prior to the onset of labor.  [facility] Ultrasound 21 with subchorionic hemorrhage.         Subchorionic hematoma in first trimester    Overview     [facility] ultrasound 2021.  Concern for morbidly adherent placenta.   21; MEDINA resolved.          Morbidly adherent placenta, antepartum    Overview     Concern for c section scar implantation.    [facility] 21; Placenta previa; anterior. Myometrial thickness 5 mm; watch for PAS ( Placenta Accreta Syndrome)               1. Pregnancy at 14w1d, follow up from ER last week. Now with occasional brown discharge. Denies heavy bleeding or pelvic pain. Rhogam given 21.   2. Fetal status reassuring. +FHT's.   3. Bleeding precautions reviewed.   4. [facility] scan in 2 days, with follow up here next week with Sylvia.       Jasmin Paez, ZAK  2021

## 2021-07-15 ENCOUNTER — HOSPITAL ENCOUNTER (OUTPATIENT)
Dept: WOMENS IMAGING | Facility: HOSPITAL | Age: 31
Discharge: HOME | End: 2021-07-15
Admitting: OBSTETRICS & GYNECOLOGY

## 2021-07-15 ENCOUNTER — OFFICE VISIT (OUTPATIENT)
Dept: OBSTETRICS AND GYNECOLOGY | Facility: HOSPITAL | Age: 31
End: 2021-07-15

## 2021-07-15 VITALS — WEIGHT: 171.2 LBS | BODY MASS INDEX: 26.81 KG/M2 | DIASTOLIC BLOOD PRESSURE: 76 MMHG | SYSTOLIC BLOOD PRESSURE: 108 MMHG

## 2021-07-15 DIAGNOSIS — O41.8X10 SUBCHORIONIC HEMATOMA IN FIRST TRIMESTER, SINGLE OR UNSPECIFIED FETUS: ICD-10-CM

## 2021-07-15 DIAGNOSIS — O44.00 PLACENTA PREVIA ANTEPARTUM: Primary | ICD-10-CM

## 2021-07-15 DIAGNOSIS — Z34.90 PREGNANCY, UNSPECIFIED GESTATIONAL AGE: ICD-10-CM

## 2021-07-15 DIAGNOSIS — O34.219 HISTORY OF CESAREAN SECTION COMPLICATING PREGNANCY: ICD-10-CM

## 2021-07-15 DIAGNOSIS — O46.8X1 SUBCHORIONIC HEMATOMA IN FIRST TRIMESTER, SINGLE OR UNSPECIFIED FETUS: ICD-10-CM

## 2021-07-15 PROCEDURE — 76815 OB US LIMITED FETUS(S): CPT

## 2021-07-15 PROCEDURE — 76815 OB US LIMITED FETUS(S): CPT | Performed by: OBSTETRICS & GYNECOLOGY

## 2021-07-15 NOTE — PROGRESS NOTES
States she feels occasional fetal movement. Was seen in [facility] ER 7/8/21 with vaginal bleeding & passing clots. States only having brown spotting now. Next OB visit is 7/21/21.

## 2021-07-16 ENCOUNTER — TELEPHONE (OUTPATIENT)
Dept: OBSTETRICS AND GYNECOLOGY | Facility: CLINIC | Age: 31
End: 2021-07-16

## 2021-07-16 NOTE — TELEPHONE ENCOUNTER
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 They want her weight restriction to be 25 lbs so that she can be on office duty. Since [employer] is such a large [employer] this is coming from management. They want to avoid any possible risk associated with patient and baby.

## 2021-07-21 ENCOUNTER — ROUTINE PRENATAL (OUTPATIENT)
Dept: OBSTETRICS AND GYNECOLOGY | Facility: CLINIC | Age: 31
End: 2021-07-21

## 2021-07-21 VITALS — SYSTOLIC BLOOD PRESSURE: 100 MMHG | WEIGHT: 174 LBS | DIASTOLIC BLOOD PRESSURE: 62 MMHG | BODY MASS INDEX: 27.25 KG/M2

## 2021-07-21 DIAGNOSIS — O46.8X1 SUBCHORIONIC HEMATOMA IN FIRST TRIMESTER, SINGLE OR UNSPECIFIED FETUS: ICD-10-CM

## 2021-07-21 DIAGNOSIS — O26.899 RH NEGATIVE, ANTEPARTUM: ICD-10-CM

## 2021-07-21 DIAGNOSIS — Z67.91 RH NEGATIVE, ANTEPARTUM: ICD-10-CM

## 2021-07-21 DIAGNOSIS — O41.8X10 SUBCHORIONIC HEMATOMA IN FIRST TRIMESTER, SINGLE OR UNSPECIFIED FETUS: ICD-10-CM

## 2021-07-21 DIAGNOSIS — Z3A.15 15 WEEKS GESTATION OF PREGNANCY: Primary | ICD-10-CM

## 2021-07-21 DIAGNOSIS — O34.219 HISTORY OF CESAREAN SECTION COMPLICATING PREGNANCY: ICD-10-CM

## 2021-07-21 LAB
GLUCOSE UR STRIP-MCNC: NEGATIVE MG/DL
PROT UR STRIP-MCNC: NEGATIVE MG/DL

## 2021-07-21 PROCEDURE — 0502F SUBSEQUENT PRENATAL CARE: CPT | Performed by: OBSTETRICS & GYNECOLOGY

## 2021-07-21 NOTE — PROGRESS NOTES
OB FOLLOW UP  CC- Here for care of pregnancy        Cooper Bah is a 31 y.o.  15w2d patient being seen today for her obstetrical follow up visit. Patient reports headache.     Her prenatal care is complicated by (and status) :    Patient Active Problem List   Diagnosis   • Hematemesis with nausea   • Weight loss   • Abdominal pain   • Liver mass   • Focal nodular hyperplasia of liver   • Pregnancy   • Rh negative, antepartum   • Screening for cervical cancer   • Endometriosis   • History of  section complicating pregnancy   • Pelvic pain   • Threatened    • BV (bacterial vaginosis)   • Abdominal tenderness of left lower quadrant   • Subchorionic hematoma in first trimester   • Morbidly adherent placenta, antepartum   • Placenta previa antepartum       Flu Status: Already given in current flu season  Ultrasound Today: No.    ROS -   Patient Reports : No Problems  Patient Denies: Loss of Fluid, Vaginal Spotting, Vision Changes, Headaches, Nausea , Vomiting , Contractions and Epigastric pain  Fetal Movement : normal  All other systems reviewed and are negative.       The additional following portions of the patient's history were reviewed and updated as appropriate: allergies, current medications, past family history, past medical history, past social history, past surgical history and problem list.    I have reviewed and agree with the HPI, ROS, and historical information as entered above. Nico Ward MD    /62   Wt 78.9 kg (174 lb)   LMP 2021 (Approximate)   BMI 27.25 kg/m²       EXAM:     FHT: 144 BPM   Uterine Size: 15 cm  Pelvic Exam: No    Urine glucose/protein: See prenatal flowsheet       Assessment and Plan    Problem List Items Addressed This Visit            Pregnancy - Primary    Overview     Received RhoGam in ER for bleeding on 2021.  Dates by 6 to 7-week ultrasound.  History of prior  for breech.  Discussed option of .  Persistent  pelvic cramping.  Cell Free DNA screen low risk; consistent with male  Need AFP 4 at 16 wk.          Relevant Orders    POC Urinalysis Dipstick    Alpha Fetoprotein, Maternal    Rh negative, antepartum    Overview     Maternal blood type A negative   2021 RhoGam in ER for threatened AB         History of  section complicating pregnancy    Overview     Prior C section for Breech; ; Chance.   Patient does have a prior history of pelvic fracture.         Subchorionic hematoma in first trimester    Overview     [facility] ultrasound 2021.  Concern for morbidly adherent placenta.   21; NADIR resolved.     [facility] 7/15/21; NADIR 4.2 x 6 cm. Second bleeding episode; ED 21.          Morbidly adherent placenta, antepartum    Overview     Concern for c section scar implantation.    [facility] 21; Placenta previa; anterior. Myometrial thickness 5 mm; watch for PAS ( Placenta Accreta Syndrome)  [facility] u/s 7/15/21; post bleeding episode ; NADIR 4 x 6 cm.;  Placenta anterior, low lying with good clear zone.  No evidence accreta at present.Follow-up [facility] ultrasound 2021               1. Pregnancy at 15w2d; patient had a second bleeding episode on seven eight and went to the emergency department. Ultrasound done perhaps by the laborist in the ED. No report in epic.  2. Had a [facility] follow-up ultrasound 7/15/2021 with Nadir present 4.2 x 6 cm. The placenta is now low-lying and did not appear to be invading into the myometrium.  3. At risk for morbidly adherent placenta. Has follow-up [facility] ultrasound 2021. AFP to be drawn next week.  4. Fetal status reassuring. Anomaly scan at [facility] in 4 weeks.  5. Activity and Exercise discussed.  Return in about 5 weeks (around 2021) for Next scheduled follow up.    Nico Ward MD  2021

## 2021-07-29 ENCOUNTER — LAB (OUTPATIENT)
Dept: OBSTETRICS AND GYNECOLOGY | Facility: CLINIC | Age: 31
End: 2021-07-29

## 2021-07-31 LAB
AFP ADJ MOM SERPL: 1.61
AFP INTERP SERPL-IMP: NORMAL
AFP INTERP SERPL-IMP: NORMAL
AFP SERPL-MCNC: 52.5 NG/ML
AGE AT DELIVERY: 32.7 YR
GA METHOD: NORMAL
GA: 16.4 WEEKS
IDDM PATIENT QL: NO
LABORATORY COMMENT REPORT: NORMAL
MULTIPLE PREGNANCY: NO
NEURAL TUBE DEFECT RISK FETUS: 2047 %
RESULT: NORMAL

## 2021-08-13 ENCOUNTER — TELEPHONE (OUTPATIENT)
Dept: OBSTETRICS AND GYNECOLOGY | Facility: CLINIC | Age: 31
End: 2021-08-13

## 2021-08-13 NOTE — TELEPHONE ENCOUNTER
Pt lvm stating she is pregnant and high risk. Her  and her son have chicken pox or measles, she wants to know if there is anything that she needs to do.

## 2021-08-13 NOTE — TELEPHONE ENCOUNTER
Pt. Has has already had chicken pox and shingles and has had her MMR. Informed ok if she has had them in the past.

## 2021-08-26 ENCOUNTER — HOSPITAL ENCOUNTER (OUTPATIENT)
Dept: WOMENS IMAGING | Facility: HOSPITAL | Age: 31
Discharge: HOME | End: 2021-08-26
Admitting: OBSTETRICS & GYNECOLOGY

## 2021-08-26 ENCOUNTER — OFFICE VISIT (OUTPATIENT)
Dept: OBSTETRICS AND GYNECOLOGY | Facility: HOSPITAL | Age: 31
End: 2021-08-26

## 2021-08-26 VITALS — BODY MASS INDEX: 27.75 KG/M2 | SYSTOLIC BLOOD PRESSURE: 111 MMHG | DIASTOLIC BLOOD PRESSURE: 62 MMHG | WEIGHT: 177.2 LBS

## 2021-08-26 DIAGNOSIS — O44.00 PLACENTA PREVIA ANTEPARTUM: ICD-10-CM

## 2021-08-26 DIAGNOSIS — O34.219 HISTORY OF CESAREAN SECTION COMPLICATING PREGNANCY: ICD-10-CM

## 2021-08-26 DIAGNOSIS — O41.8X10 SUBCHORIONIC HEMATOMA IN FIRST TRIMESTER, SINGLE OR UNSPECIFIED FETUS: ICD-10-CM

## 2021-08-26 DIAGNOSIS — O46.8X1 SUBCHORIONIC HEMATOMA IN FIRST TRIMESTER, SINGLE OR UNSPECIFIED FETUS: ICD-10-CM

## 2021-08-26 DIAGNOSIS — O34.219 HISTORY OF CESAREAN SECTION COMPLICATING PREGNANCY: Primary | ICD-10-CM

## 2021-08-26 DIAGNOSIS — Z34.90 PREGNANCY, UNSPECIFIED GESTATIONAL AGE: ICD-10-CM

## 2021-08-26 PROCEDURE — 76811 OB US DETAILED SNGL FETUS: CPT | Performed by: OBSTETRICS & GYNECOLOGY

## 2021-08-26 PROCEDURE — 76811 OB US DETAILED SNGL FETUS: CPT

## 2021-08-27 ENCOUNTER — ROUTINE PRENATAL (OUTPATIENT)
Dept: OBSTETRICS AND GYNECOLOGY | Facility: CLINIC | Age: 31
End: 2021-08-27

## 2021-08-27 VITALS — BODY MASS INDEX: 27.88 KG/M2 | WEIGHT: 178 LBS | SYSTOLIC BLOOD PRESSURE: 110 MMHG | DIASTOLIC BLOOD PRESSURE: 64 MMHG

## 2021-08-27 DIAGNOSIS — O26.899 RH NEGATIVE, ANTEPARTUM: ICD-10-CM

## 2021-08-27 DIAGNOSIS — O46.8X1 SUBCHORIONIC HEMATOMA IN FIRST TRIMESTER, SINGLE OR UNSPECIFIED FETUS: ICD-10-CM

## 2021-08-27 DIAGNOSIS — Z67.91 RH NEGATIVE, ANTEPARTUM: ICD-10-CM

## 2021-08-27 DIAGNOSIS — O34.219 HISTORY OF CESAREAN SECTION COMPLICATING PREGNANCY: ICD-10-CM

## 2021-08-27 DIAGNOSIS — Z3A.20 20 WEEKS GESTATION OF PREGNANCY: Primary | ICD-10-CM

## 2021-08-27 DIAGNOSIS — O41.8X10 SUBCHORIONIC HEMATOMA IN FIRST TRIMESTER, SINGLE OR UNSPECIFIED FETUS: ICD-10-CM

## 2021-08-27 LAB
GLUCOSE UR STRIP-MCNC: NEGATIVE MG/DL
PROT UR STRIP-MCNC: NEGATIVE MG/DL

## 2021-08-27 PROCEDURE — 99211 OFF/OP EST MAY X REQ PHY/QHP: CPT | Performed by: OBSTETRICS & GYNECOLOGY

## 2021-08-27 NOTE — PROGRESS NOTES
OB FOLLOW UP  CC- Here for care of pregnancy        Cooper Bah is a 31 y.o.  20w4d patient being seen today for her obstetrical follow up visit. Patient reports no complaints.  Patient had follow-up [facility] ultrasound performed yesterday.  Normal growth was noted baby is breech presentation and placenta was notably low-lying.  There is no evidence of abnormal placentation.  Plan to repeat ultrasound at 28 weeks.    Her prenatal care is complicated by (and status) :    Patient Active Problem List   Diagnosis   • Hematemesis with nausea   • Weight loss   • Abdominal pain   • Liver mass   • Focal nodular hyperplasia of liver   • Pregnancy   • Rh negative, antepartum   • Screening for cervical cancer   • Endometriosis   • History of  section complicating pregnancy   • Pelvic pain   • Threatened    • BV (bacterial vaginosis)   • Abdominal tenderness of left lower quadrant   • Subchorionic hematoma in first trimester   • Morbidly adherent placenta, antepartum   • Placenta previa antepartum       Flu Status: Already given in current flu season  Ultrasound Today: No.    ROS -   Patient Reports : No Problems  Patient Denies: Loss of Fluid, Vaginal Spotting, Vision Changes, Headaches, Nausea , Vomiting , Contractions and Epigastric pain  Fetal Movement : normal  All other systems reviewed and are negative.       The additional following portions of the patient's history were reviewed and updated as appropriate: allergies, current medications, past family history, past medical history, past social history, past surgical history and problem list.    I have reviewed and agree with the HPI, ROS, and historical information as entered above. Nico Ward MD    /64   Wt 80.7 kg (178 lb)   LMP 2021 (Approximate)   BMI 27.88 kg/m²       EXAM:     FHT: 143 BPM   Uterine Size: size equals dates  Pelvic Exam: No    Urine glucose/protein: See prenatal flowsheet       Assessment and  Plan    Problem List Items Addressed This Visit            Pregnancy - Primary    Overview     Received RhoGam in ER for bleeding on 2021.  Dates by 6 to 7-week ultrasound.  History of prior  for breech.  Discussed option of .  Persistent pelvic cramping.  Cell Free DNA screen low risk; consistent with male  Need AFP 4 at 16 wk.          Relevant Orders    POC Urinalysis Dipstick (Completed)    Rh negative, antepartum    Overview     Maternal blood type A negative   2021 RhoGam in ER for threatened AB         History of  section complicating pregnancy    Overview     Prior C section for Breech; ; Chance.   Patient does have a prior history of pelvic fracture.         Subchorionic hematoma in first trimester    Overview     [facility] ultrasound 2021.  Concern for morbidly adherent placenta.   21; MEDINA resolved.     [facility] 7/15/21; MEDINA 4.2 x 6 cm. Second bleeding episode; ED 21.          Morbidly adherent placenta, antepartum    Overview     Concern for c section scar implantation.    [facility] 21; Placenta previa; anterior. Myometrial thickness 5 mm; watch for PAS ( Placenta Accreta Syndrome)  [facility] u/s 7/15/21; post bleeding episode ; MEDINA 4 x 6 cm.;  Placenta anterior, low lying with good clear zone.  No evidence accreta at present.     [facility] ultrasound 2021; placenta anterior low-lying.  No evidence of abnormal placentation               1. Pregnancy at 20w4d.  2. Now with a low-lying placenta but no evidence of abnormal placentation.  Has follow-up ultrasound at [facility] at 28 weeks.  3. Fetal status reassuring.   4. Activity and Exercise discussed.  Return in about 4 weeks (around 2021) for Next scheduled follow up.    Nico Ward MD  2021

## 2021-09-24 ENCOUNTER — ROUTINE PRENATAL (OUTPATIENT)
Dept: OBSTETRICS AND GYNECOLOGY | Facility: CLINIC | Age: 31
End: 2021-09-24

## 2021-09-24 VITALS — DIASTOLIC BLOOD PRESSURE: 72 MMHG | WEIGHT: 183 LBS | SYSTOLIC BLOOD PRESSURE: 110 MMHG | BODY MASS INDEX: 28.66 KG/M2

## 2021-09-24 DIAGNOSIS — O34.219 HISTORY OF CESAREAN SECTION COMPLICATING PREGNANCY: ICD-10-CM

## 2021-09-24 DIAGNOSIS — Z67.91 RH NEGATIVE, ANTEPARTUM: ICD-10-CM

## 2021-09-24 DIAGNOSIS — Z3A.24 24 WEEKS GESTATION OF PREGNANCY: Primary | ICD-10-CM

## 2021-09-24 DIAGNOSIS — O46.8X1 SUBCHORIONIC HEMATOMA IN FIRST TRIMESTER, SINGLE OR UNSPECIFIED FETUS: ICD-10-CM

## 2021-09-24 DIAGNOSIS — O26.899 RH NEGATIVE, ANTEPARTUM: ICD-10-CM

## 2021-09-24 DIAGNOSIS — Z12.4 SCREENING FOR CERVICAL CANCER: ICD-10-CM

## 2021-09-24 DIAGNOSIS — O41.8X10 SUBCHORIONIC HEMATOMA IN FIRST TRIMESTER, SINGLE OR UNSPECIFIED FETUS: ICD-10-CM

## 2021-09-24 LAB
GLUCOSE UR STRIP-MCNC: NEGATIVE MG/DL
PROT UR STRIP-MCNC: NEGATIVE MG/DL

## 2021-09-24 PROCEDURE — 99213 OFFICE O/P EST LOW 20 MIN: CPT | Performed by: OBSTETRICS & GYNECOLOGY

## 2021-09-24 NOTE — PROGRESS NOTES
OB FOLLOW UP  CC- Here for care of pregnancy        Cooper Bah is a 31 y.o.  24w4d patient being seen today for her obstetrical follow up visit. Patient reports no complaints.     Her prenatal care is complicated by (and status) :    Patient Active Problem List   Diagnosis   • Hematemesis with nausea   • Weight loss   • Abdominal pain   • Liver mass   • Focal nodular hyperplasia of liver   • Pregnancy   • Rh negative, antepartum   • Screening for cervical cancer   • Endometriosis   • History of  section complicating pregnancy   • Pelvic pain   • Threatened    • BV (bacterial vaginosis)   • Abdominal tenderness of left lower quadrant   • Subchorionic hematoma in first trimester   • Morbidly adherent placenta, antepartum   • Placenta previa antepartum       Flu Status: unsure  Ultrasound Today: No.    ROS -   Patient Reports : No Problems  Patient Denies: Loss of Fluid, Vaginal Spotting, Vision Changes, Headaches, Nausea , Vomiting , Contractions and Epigastric pain  Fetal Movement : normal  All other systems reviewed and are negative.       The additional following portions of the patient's history were reviewed and updated as appropriate: allergies, current medications, past family history, past medical history, past social history, past surgical history and problem list.    I have reviewed and agree with the HPI, ROS, and historical information as entered above. Nico Ward MD    /72   Wt 83 kg (183 lb)   LMP 2021 (Approximate)   BMI 28.66 kg/m²       EXAM:     FHT: 148 BPM   Uterine Size: 23 cm  Pelvic Exam: No    Urine glucose/protein: See prenatal flowsheet       Assessment and Plan    Problem List Items Addressed This Visit            Pregnancy - Primary    Overview     Received RhoGam in ER for bleeding on 2021.  Dates by 6 to 7-week ultrasound.  History of prior  for breech.  Discussed option of .  Persistent pelvic cramping.  Cell Free  DNA screen low risk; consistent with male  Need AFP 4 at 16 wk.          Relevant Orders    POC Urinalysis Dipstick (Completed)    Rh negative, antepartum    Overview     Maternal blood type A negative   2021 RhoGam in ER for threatened AB         Screening for cervical cancer    Overview     Last Pap smear in 2020.  Repeat Pap smear 6 weeks postpartum.         History of  section complicating pregnancy    Overview     Prior C section for Breech; ; [place].   Patient does have a prior history of pelvic fracture.         Subchorionic hematoma in first trimester    Overview     PDC ultrasound 2021.  Concern for morbidly adherent placenta.   21; MEDINA resolved.     PDC 7/15/21; MEDINA 4.2 x 6 cm. Second bleeding episode; ED 21.          Morbidly adherent placenta, antepartum    Overview     Concern for c section scar implantation.    PDC 21; Placenta previa; anterior. Myometrial thickness 5 mm; watch for PAS ( Placenta Accreta Syndrome)  PDC u/s 7/15/21; post bleeding episode ; MEDINA 4 x 6 cm.;  Placenta anterior, low lying with good clear zone.  No evidence accreta at present.     PDC ultrasound 2021; placenta anterior low-lying.  No evidence of abnormal placentation; F/u PDC 10/21.                1. Pregnancy at 24w4d; Doing well; no abdominal pain or contractions. One hour glucose screen next.   2. Fetal status reassuring. Rhogam next.   3. Activity and Exercise discussed.  Return in about 4 weeks (around 10/22/2021) for One hour Glucose Screen, Next scheduled follow up.    Nico Ward MD  2021

## 2021-10-19 ENCOUNTER — TELEPHONE (OUTPATIENT)
Dept: OBSTETRICS AND GYNECOLOGY | Facility: CLINIC | Age: 31
End: 2021-10-19

## 2021-10-19 NOTE — TELEPHONE ENCOUNTER
Patient called in reports that she believes she has an ear infection. She requested to speak with a nurse and hopefully get some medication called in for the issue.

## 2021-10-19 NOTE — TELEPHONE ENCOUNTER
S/w pt she states she has an ear infection and she has called her PCP for an appt to get antibiotic and states they cannot see her for atleast 3 weeks. She has an appt this coming Friday with Dr. Ward and would like to know if we can send in an antibiotic or something that will help get rid of this. Patient states that she cannot take amoxicillin due to it not getting rid of her symptoms before. I told patient I would discuss with NP and CB with a plan. She v/u     [facility] verified [facility] in [place]

## 2021-10-21 ENCOUNTER — HOSPITAL ENCOUNTER (OUTPATIENT)
Dept: WOMENS IMAGING | Facility: HOSPITAL | Age: 31
Discharge: HOME | End: 2021-10-21
Admitting: OBSTETRICS & GYNECOLOGY

## 2021-10-21 ENCOUNTER — OFFICE VISIT (OUTPATIENT)
Dept: OBSTETRICS AND GYNECOLOGY | Facility: HOSPITAL | Age: 31
End: 2021-10-21

## 2021-10-21 VITALS — DIASTOLIC BLOOD PRESSURE: 66 MMHG | SYSTOLIC BLOOD PRESSURE: 117 MMHG | WEIGHT: 186 LBS | BODY MASS INDEX: 29.13 KG/M2

## 2021-10-21 DIAGNOSIS — O44.00 PLACENTA PREVIA ANTEPARTUM: Primary | ICD-10-CM

## 2021-10-21 DIAGNOSIS — Z34.90 PREGNANCY, UNSPECIFIED GESTATIONAL AGE: ICD-10-CM

## 2021-10-21 DIAGNOSIS — O41.8X10 SUBCHORIONIC HEMATOMA IN FIRST TRIMESTER, SINGLE OR UNSPECIFIED FETUS: ICD-10-CM

## 2021-10-21 DIAGNOSIS — O46.8X1 SUBCHORIONIC HEMATOMA IN FIRST TRIMESTER, SINGLE OR UNSPECIFIED FETUS: ICD-10-CM

## 2021-10-21 DIAGNOSIS — O34.219 HISTORY OF CESAREAN SECTION COMPLICATING PREGNANCY: ICD-10-CM

## 2021-10-21 PROCEDURE — 76816 OB US FOLLOW-UP PER FETUS: CPT | Performed by: OBSTETRICS & GYNECOLOGY

## 2021-10-21 PROCEDURE — 76816 OB US FOLLOW-UP PER FETUS: CPT

## 2021-10-21 NOTE — PROGRESS NOTES
Documentation of the ultasound findings, images, and interpretations will be available in the patient's Viewpoint report which is located in the imaging tab in chart review.   Please see note below   ANDRES ROSSI

## 2021-10-22 ENCOUNTER — ROUTINE PRENATAL (OUTPATIENT)
Dept: OBSTETRICS AND GYNECOLOGY | Facility: CLINIC | Age: 31
End: 2021-10-22

## 2021-10-22 VITALS — SYSTOLIC BLOOD PRESSURE: 120 MMHG | BODY MASS INDEX: 29.13 KG/M2 | DIASTOLIC BLOOD PRESSURE: 68 MMHG | WEIGHT: 186 LBS

## 2021-10-22 DIAGNOSIS — Z3A.28 28 WEEKS GESTATION OF PREGNANCY: Primary | ICD-10-CM

## 2021-10-22 DIAGNOSIS — O34.219 HISTORY OF CESAREAN SECTION COMPLICATING PREGNANCY: ICD-10-CM

## 2021-10-22 LAB
GLUCOSE UR STRIP-MCNC: NEGATIVE MG/DL
PROT UR STRIP-MCNC: NEGATIVE MG/DL

## 2021-10-22 PROCEDURE — 90471 IMMUNIZATION ADMIN: CPT | Performed by: OBSTETRICS & GYNECOLOGY

## 2021-10-22 PROCEDURE — 90715 TDAP VACCINE 7 YRS/> IM: CPT | Performed by: OBSTETRICS & GYNECOLOGY

## 2021-10-22 PROCEDURE — 0502F SUBSEQUENT PRENATAL CARE: CPT | Performed by: OBSTETRICS & GYNECOLOGY

## 2021-10-22 NOTE — PROGRESS NOTES
OB FOLLOW UP  CC- Here for care of pregnancy        Cooper Bah is a 31 y.o.  28w4d patient being seen today for her obstetrical follow up visit. Patient reports no complaints.     Her prenatal care is complicated by (and status) :    Patient Active Problem List   Diagnosis   • Hematemesis with nausea   • Weight loss   • Abdominal pain   • Liver mass   • Focal nodular hyperplasia of liver   • Pregnancy   • Rh negative, antepartum   • Screening for cervical cancer   • Endometriosis   • History of  section complicating pregnancy   • Pelvic pain   • Threatened    • BV (bacterial vaginosis)   • Abdominal tenderness of left lower quadrant   • Subchorionic hematoma in first trimester   • Morbidly adherent placenta, antepartum       Flu Status: Declines  Ultrasound Today: No.    ROS -   Patient Reports : No Problems  Patient Denies: Loss of Fluid, Vaginal Spotting, Vision Changes, Headaches, Nausea , Vomiting , Contractions and Epigastric pain  Fetal Movement : normal  All other systems reviewed and are negative.       The additional following portions of the patient's history were reviewed and updated as appropriate: allergies, current medications, past family history, past medical history, past social history, past surgical history and problem list.    I have reviewed and agree with the HPI, ROS, and historical information as entered above. Nico Ward MD    /68   Wt 84.4 kg (186 lb)   LMP 2021 (Approximate)   BMI 29.13 kg/m²       EXAM:     FHT: 143 BPM   Uterine Size: size equals dates  Pelvic Exam: No    Urine glucose/protein: See prenatal flowsheet       Assessment and Plan    Problem List Items Addressed This Visit            Pregnancy - Primary    Overview     Received RhoGam in ER for bleeding on 2021.  Dates by 6 to 7-week ultrasound.  History of prior  for breech.  Discussed option of .  Persistent pelvic cramping.  Cell Free DNA screen low  risk; consistent with male  Need AFP 4 at 16 wk.          Relevant Orders    POC Urinalysis Dipstick (Completed)    Gestational Screen 1 Hr ([facility])    CBC (No Diff)    Antibody Screen    History of  section complicating pregnancy    Overview     Prior C section for Breech; ; [place].   Patient does have a prior history of pelvic fracture.  Patient interested in  if no concern of placenta accreta.         Morbidly adherent placenta, antepartum    Overview     Concern for c section scar implantation.    [facility] 21; Placenta previa; anterior. Myometrial thickness 5 mm; watch for PAS ( Placenta Accreta Syndrome)  PDC u/s 7/15/21; post bleeding episode ; MEDINA 4 x 6 cm.;  Placenta anterior, low lying with good clear zone.  No evidence accreta at present.     [facility] ultrasound 2021; placenta anterior low-lying.  No evidence of abnormal placentation; F/u PDC 10/21.     [facility] ultrasound 10/21/2021 placenta extending down to area of old  scar.  No evidence of abnormal placentation or accreta.  Normal fetal growth with estimated fetal weight of 56 percentile.               1. Pregnancy at 28w4d; 1 hr BS today.  2. [facility] ultrasound 10/21/2021 with anterior placenta extending down to area of old  scar.  No evidence of abnormal placentation.  Released from f/u.   3. Fetal status reassuring.  Estimated fetal weight 56 percentile at [facility] ultrasound.  4. Pt states interest in  if no concern for abnormal placentation.   5. Activity and Exercise discussed.  Return in about 3 weeks (around 2021).    Nico Ward MD  10/22/2021

## 2021-10-23 LAB
BLD GP AB SCN SERPL QL: NEGATIVE
ERYTHROCYTE [DISTWIDTH] IN BLOOD BY AUTOMATED COUNT: 11.8 % (ref 12.3–15.4)
GLUCOSE 1H P 50 G GLC PO SERPL-MCNC: 76 MG/DL (ref 65–139)
HCT VFR BLD AUTO: 35.9 % (ref 34–46.6)
HGB BLD-MCNC: 12 G/DL (ref 12–15.9)
MCH RBC QN AUTO: 31.4 PG (ref 26.6–33)
MCHC RBC AUTO-ENTMCNC: 33.4 G/DL (ref 31.5–35.7)
MCV RBC AUTO: 94 FL (ref 79–97)
PLATELET # BLD AUTO: 282 10*3/MM3 (ref 140–450)
RBC # BLD AUTO: 3.82 10*6/MM3 (ref 3.77–5.28)
WBC # BLD AUTO: 16.24 10*3/MM3 (ref 3.4–10.8)

## 2021-11-10 ENCOUNTER — ROUTINE PRENATAL (OUTPATIENT)
Dept: OBSTETRICS AND GYNECOLOGY | Facility: CLINIC | Age: 31
End: 2021-11-10

## 2021-11-10 VITALS — SYSTOLIC BLOOD PRESSURE: 115 MMHG | WEIGHT: 187 LBS | BODY MASS INDEX: 29.29 KG/M2 | DIASTOLIC BLOOD PRESSURE: 70 MMHG

## 2021-11-10 DIAGNOSIS — O41.8X10 SUBCHORIONIC HEMATOMA IN FIRST TRIMESTER, SINGLE OR UNSPECIFIED FETUS: ICD-10-CM

## 2021-11-10 DIAGNOSIS — Z3A.31 31 WEEKS GESTATION OF PREGNANCY: Primary | ICD-10-CM

## 2021-11-10 DIAGNOSIS — O46.8X1 SUBCHORIONIC HEMATOMA IN FIRST TRIMESTER, SINGLE OR UNSPECIFIED FETUS: ICD-10-CM

## 2021-11-10 DIAGNOSIS — O34.219 HISTORY OF CESAREAN SECTION COMPLICATING PREGNANCY: ICD-10-CM

## 2021-11-10 DIAGNOSIS — Z67.91 RH NEGATIVE, ANTEPARTUM: ICD-10-CM

## 2021-11-10 DIAGNOSIS — O26.899 RH NEGATIVE, ANTEPARTUM: ICD-10-CM

## 2021-11-10 LAB
GLUCOSE UR STRIP-MCNC: NEGATIVE MG/DL
PROT UR STRIP-MCNC: NEGATIVE MG/DL

## 2021-11-10 PROCEDURE — 99213 OFFICE O/P EST LOW 20 MIN: CPT | Performed by: OBSTETRICS & GYNECOLOGY

## 2021-11-10 NOTE — PROGRESS NOTES
OB FOLLOW UP  CC- Here for care of pregnancy        Cooper Bah is a 31 y.o.  31w2d patient being seen today for her obstetrical follow up visit. Patient reports backache, heartburn, nausea and SOB.     Her prenatal care is complicated by (and status) :    Patient Active Problem List   Diagnosis   • Hematemesis with nausea   • Weight loss   • Abdominal pain   • Liver mass   • Focal nodular hyperplasia of liver   • Pregnancy   • Rh negative, antepartum   • Screening for cervical cancer   • Endometriosis   • History of  section complicating pregnancy   • Pelvic pain   • Threatened    • BV (bacterial vaginosis)   • Abdominal tenderness of left lower quadrant   • Subchorionic hematoma in first trimester   • Morbidly adherent placenta, antepartum       Flu Status: Declines  Ultrasound Today: No.    ROS -   Patient Reports : Nausea and SOB  Patient Denies: Loss of Fluid, Vaginal Spotting, Vision Changes, Headaches, Vomiting , Contractions and Epigastric pain  Fetal Movement : normal  All other systems reviewed and are negative.       The additional following portions of the patient's history were reviewed and updated as appropriate: allergies, current medications, past family history, past medical history, past social history, past surgical history and problem list.    I have reviewed and agree with the HPI, ROS, and historical information as entered above. Nico Ward MD    /70   Wt 84.8 kg (187 lb)   LMP 2021 (Approximate)   BMI 29.29 kg/m²       EXAM:     FHT: 140 BPM   Uterine Size: size equals dates  Pelvic Exam: No    Urine glucose/protein: See prenatal flowsheet       Assessment and Plan    Problem List Items Addressed This Visit            Pregnancy - Primary    Overview     Received RhoGam in ER for bleeding on 2021.  Dates by 6 to 7-week ultrasound.  History of prior  for breech.  Discussed option of .  Persistent pelvic cramping.  Cell Free  DNA screen low risk; consistent with male  Need AFP 4 at 16 wk.          Relevant Orders    POC Urinalysis Dipstick (Completed)    Rh negative, antepartum    Overview     Maternal blood type A negative   2021 RhoGam in ER for threatened AB         History of  section complicating pregnancy    Overview     Prior C section for Breech; ; [place].   Patient does have a prior history of pelvic fracture.  Patient interested in  if no concern of placenta accreta.         Subchorionic hematoma in first trimester    Overview     PDC ultrasound 2021.  Concern for morbidly adherent placenta.   21; MEDINA resolved.     PDC 7/15/21; MEDINA 4.2 x 6 cm. Second bleeding episode; ED 21.          Morbidly adherent placenta, antepartum    Overview     Concern for c section scar implantation.    PDC 21; Placenta previa; anterior. Myometrial thickness 5 mm; watch for PAS ( Placenta Accreta Syndrome)  PDC u/s 7/15/21; post bleeding episode ; MEDINA 4 x 6 cm.;  Placenta anterior, low lying with good clear zone.  No evidence accreta at present.     PDC ultrasound 2021; placenta anterior low-lying.  No evidence of abnormal placentation; F/u PDC 10/21.     PDC ultrasound 10/21/2021 placenta extending down to area of old  scar.  No evidence of abnormal placentation or accreta.  Normal fetal growth with estimated fetal weight of 56 percentile.               1. Pregnancy at 31w2d;  2. Wants  if possible; Permit signed today.  3. H/o low lying placenta; improved on last PDC scan. Repeat PDC u/s at 36 wk.   4. Fetal status reassuring.   5. Activity and Exercise discussed.  Return in about 2 weeks (around 2021) for Next scheduled follow up, Follow-up ultrasound.    Nico Ward MD  11/10/2021

## 2021-11-23 ENCOUNTER — ROUTINE PRENATAL (OUTPATIENT)
Dept: OBSTETRICS AND GYNECOLOGY | Facility: CLINIC | Age: 31
End: 2021-11-23

## 2021-11-23 VITALS — DIASTOLIC BLOOD PRESSURE: 60 MMHG | SYSTOLIC BLOOD PRESSURE: 100 MMHG | WEIGHT: 196 LBS | BODY MASS INDEX: 30.7 KG/M2

## 2021-11-23 DIAGNOSIS — N80.9 ENDOMETRIOSIS: ICD-10-CM

## 2021-11-23 DIAGNOSIS — N89.8 VAGINAL ITCHING: ICD-10-CM

## 2021-11-23 DIAGNOSIS — O34.219 HISTORY OF CESAREAN SECTION COMPLICATING PREGNANCY: ICD-10-CM

## 2021-11-23 DIAGNOSIS — N89.8 VAGINAL DISCHARGE DURING PREGNANCY IN THIRD TRIMESTER: ICD-10-CM

## 2021-11-23 DIAGNOSIS — Z12.4 SCREENING FOR CERVICAL CANCER: ICD-10-CM

## 2021-11-23 DIAGNOSIS — O41.8X10 SUBCHORIONIC HEMATOMA IN FIRST TRIMESTER, SINGLE OR UNSPECIFIED FETUS: ICD-10-CM

## 2021-11-23 DIAGNOSIS — O26.899 RH NEGATIVE, ANTEPARTUM: ICD-10-CM

## 2021-11-23 DIAGNOSIS — Z67.91 RH NEGATIVE, ANTEPARTUM: ICD-10-CM

## 2021-11-23 DIAGNOSIS — Z3A.33 33 WEEKS GESTATION OF PREGNANCY: Primary | ICD-10-CM

## 2021-11-23 DIAGNOSIS — O26.893 VAGINAL DISCHARGE DURING PREGNANCY IN THIRD TRIMESTER: ICD-10-CM

## 2021-11-23 DIAGNOSIS — B37.9 YEAST INFECTION: ICD-10-CM

## 2021-11-23 DIAGNOSIS — R10.2 PELVIC PAIN: ICD-10-CM

## 2021-11-23 DIAGNOSIS — O46.8X1 SUBCHORIONIC HEMATOMA IN FIRST TRIMESTER, SINGLE OR UNSPECIFIED FETUS: ICD-10-CM

## 2021-11-23 LAB
GLUCOSE UR STRIP-MCNC: NEGATIVE MG/DL
KOH PREP NAIL: ABNORMAL
PROT UR STRIP-MCNC: NEGATIVE MG/DL
WET PREP GENITAL: NORMAL

## 2021-11-23 PROCEDURE — 0502F SUBSEQUENT PRENATAL CARE: CPT | Performed by: NURSE PRACTITIONER

## 2021-11-23 PROCEDURE — 87220 TISSUE EXAM FOR FUNGI: CPT | Performed by: NURSE PRACTITIONER

## 2021-11-23 PROCEDURE — 87210 SMEAR WET MOUNT SALINE/INK: CPT | Performed by: NURSE PRACTITIONER

## 2021-11-23 NOTE — PROGRESS NOTES
OB FOLLOW UP  CC- Here for care of pregnancy        Cooper Bah is a 31 y.o.  33w1d patient being seen today for her obstetrical follow up visit. Patient reports vaginal irritation and possible yeast infection.     Her prenatal care is complicated by (and status) :    Patient Active Problem List   Diagnosis   • Hematemesis with nausea   • Weight loss   • Abdominal pain   • Liver mass   • Focal nodular hyperplasia of liver   • Pregnancy   • Rh negative, antepartum   • Endometriosis   • History of  section complicating pregnancy   • Pelvic pain   • Threatened    • BV (bacterial vaginosis)   • Abdominal tenderness of left lower quadrant   • Subchorionic hematoma in first trimester   • Morbidly adherent placenta, antepartum         Ultrasound Today: Yes.      ROS -   Patient Reports : yeast infection  Patient Denies: Loss of Fluid, Vaginal Spotting, Vision Changes, Headaches, Nausea , Vomiting  and Contractions  Fetal Movement : normal  All other systems reviewed and are negative.       The additional following portions of the patient's history were reviewed and updated as appropriate: allergies, current medications, past family history, past medical history, past social history, past surgical history and problem list.    I have reviewed and agree with the HPI, ROS, and historical information as entered above. Jasmin Paez, APRN    /60   Wt 88.9 kg (196 lb)   LMP 2021 (Approximate)   BMI 30.70 kg/m²       EXAM:     FHT: 145 BPM   Uterine Size: 33 cm  Pelvic Exam: +copious amt thick white/yellow discharge    Urine glucose/protein: See prenatal flowsheet       Assessment and Plan    Problem List Items Addressed This Visit        Gastrointestinal Abdominal     Pelvic pain    Overview      Midline severe cramping.   Started one week prior to missed menses.  Resolved around 16 weeks pregnancy.            Genitourinary and Reproductive     Endometriosis    Overview     ; diagnostic  laparoscopy with treatment of endometriosis at 17 years of age for dysmenorrhea and abnormal bleeding.         RESOLVED: Screening for cervical cancer    Overview     Last Pap smear in 2020.  Repeat Pap smear 6 weeks postpartum.            Gravid and     Pregnancy - Primary    Overview     Received RhoGam in ER for bleeding on 2021.  Dates by 6 to 7-week ultrasound.  History of prior  for breech.  Discussed option of .  Persistent pelvic cramping.  Cell Free DNA screen low risk; consistent with male  Need AFP 4 at 16 wk.          Relevant Orders    POC Urinalysis Dipstick (Completed)    Rh negative, antepartum    Overview     Maternal blood type A negative   2021 RhoGam in ER for threatened AB         History of  section complicating pregnancy    Overview     Prior C section for Breech; ; [place].   Patient does have a prior history of pelvic fracture.  Patient interested in  if no concern of placenta accreta.  Permit 11/10/21         Subchorionic hematoma in first trimester    Overview     PDC ultrasound 2021.  Concern for morbidly adherent placenta.   21; MEDINA resolved.     PDC 7/15/21; MEDINA 4.2 x 6 cm. Second bleeding episode; ED 21.          Morbidly adherent placenta, antepartum    Overview     Concern for c section scar implantation.    PDC 21; Placenta previa; anterior. Myometrial thickness 5 mm; watch for PAS ( Placenta Accreta Syndrome)  PDC u/s 7/15/21; post bleeding episode ; MEDINA 4 x 6 cm.;  Placenta anterior, low lying with good clear zone.  No evidence accreta at present.     PDC ultrasound 2021; placenta anterior low-lying.  No evidence of abnormal placentation; F/u PDC 10/21.     PDC ultrasound 10/21/2021 placenta extending down to area of old  scar.  No evidence of abnormal placentation or accreta.  Normal fetal growth with estimated fetal weight of 56 percentile.           Other Visit Diagnoses     Yeast  infection        Relevant Orders    POC Wet Prep (Completed)    Vaginal discharge during pregnancy in third trimester        Relevant Orders    POC KOH Prep (Completed)    Vaginal itching        Relevant Orders    POC KOH Prep (Completed)          1. Pregnancy at 33w1d  2. Fetal status reassuring. U/S EFW 42%, anterior placenta, ANDRÉS 11.7. Repeat sono at [facility] btwn 36-37 wks.   3. Wet prep +yeast, Rx terazol.   4. Kick counts reviewed.   5. RTC in 2 weeks with Dr Ward.     Jasmin Paez, APRN  11/23/2021

## 2021-12-08 ENCOUNTER — ROUTINE PRENATAL (OUTPATIENT)
Dept: OBSTETRICS AND GYNECOLOGY | Facility: CLINIC | Age: 31
End: 2021-12-08

## 2021-12-08 VITALS — SYSTOLIC BLOOD PRESSURE: 118 MMHG | WEIGHT: 194.4 LBS | DIASTOLIC BLOOD PRESSURE: 72 MMHG | BODY MASS INDEX: 30.45 KG/M2

## 2021-12-08 DIAGNOSIS — O34.219 HISTORY OF CESAREAN SECTION COMPLICATING PREGNANCY: ICD-10-CM

## 2021-12-08 DIAGNOSIS — O44.40 LOW-LYING PLACENTA: ICD-10-CM

## 2021-12-08 DIAGNOSIS — Z67.91 RH NEGATIVE, ANTEPARTUM: ICD-10-CM

## 2021-12-08 DIAGNOSIS — B96.89 BV (BACTERIAL VAGINOSIS): ICD-10-CM

## 2021-12-08 DIAGNOSIS — O46.8X1 SUBCHORIONIC HEMATOMA IN FIRST TRIMESTER, SINGLE OR UNSPECIFIED FETUS: ICD-10-CM

## 2021-12-08 DIAGNOSIS — Z3A.35 35 WEEKS GESTATION OF PREGNANCY: Primary | ICD-10-CM

## 2021-12-08 DIAGNOSIS — R10.2 PELVIC PAIN: ICD-10-CM

## 2021-12-08 DIAGNOSIS — O41.8X10 SUBCHORIONIC HEMATOMA IN FIRST TRIMESTER, SINGLE OR UNSPECIFIED FETUS: ICD-10-CM

## 2021-12-08 DIAGNOSIS — N76.0 BV (BACTERIAL VAGINOSIS): ICD-10-CM

## 2021-12-08 DIAGNOSIS — O26.899 RH NEGATIVE, ANTEPARTUM: ICD-10-CM

## 2021-12-08 DIAGNOSIS — N80.9 ENDOMETRIOSIS: ICD-10-CM

## 2021-12-08 DIAGNOSIS — O20.0 THREATENED ABORTION: ICD-10-CM

## 2021-12-08 LAB
GLUCOSE UR STRIP-MCNC: NEGATIVE MG/DL
PROT UR STRIP-MCNC: NEGATIVE MG/DL

## 2021-12-08 PROCEDURE — 0502F SUBSEQUENT PRENATAL CARE: CPT | Performed by: OBSTETRICS & GYNECOLOGY

## 2021-12-08 NOTE — PROGRESS NOTES
OB FOLLOW UP  CC- Here for care of pregnancy        Cooper Bah is a 31 y.o.  35w2d patient being seen today for her obstetrical follow up visit. Patient reports heartburn. Patient taking Tums as needed. Patient having [place]. Patient denies urinary symptoms. No spotting or leaking of fluids. Patient requesting prescription for Flu vaccine. Patient completed Terazol for yeast infection. Patient states has resolved.     Her prenatal care is complicated by (and status) :    Patient Active Problem List   Diagnosis   • Hematemesis with nausea   • Weight loss   • Abdominal pain   • Liver mass   • Focal nodular hyperplasia of liver   • Pregnancy   • Rh negative, antepartum   • Endometriosis   • History of  section complicating pregnancy   • Pelvic pain   • Threatened    • BV (bacterial vaginosis)   • Abdominal tenderness of left lower quadrant   • Subchorionic hematoma in first trimester   • Morbidly adherent placenta, antepartum   • Low-lying placenta       Flu Status: Will get at work/pharmacy/other facility   Ultrasound Today: No.    ROS -   Patient Reports : Contractions - BH   Patient Denies: Loss of Fluid, Vaginal Spotting, Vision Changes, Headaches, Nausea , Vomiting , Contractions and Epigastric pain  Fetal Movement : normal  All other systems reviewed and are negative.       The additional following portions of the patient's history were reviewed and updated as appropriate: allergies, current medications, past family history, past medical history, past social history, past surgical history and problem list.    I have reviewed and agree with the HPI, ROS, and historical information as entered above. Nico Ward MD    /72   Wt 88.2 kg (194 lb 6.4 oz)   LMP 2021 (Approximate)   BMI 30.45 kg/m²       EXAM:     FHT: 150 BPM   Uterine Size: size equals dates  Pelvic Exam: No    Urine glucose/protein: See prenatal flowsheet       Assessment and Plan    Problem List  Items Addressed This Visit            Pregnancy - Primary    Overview     Received RhoGam in ER for bleeding on 2021.  Dates by 6 to 7-week ultrasound.  History of prior  for breech.  Discussed option of .  Persistent pelvic cramping.  Cell Free DNA screen low risk; consistent with male  Need AFP 4 at 16 wk.          Relevant Orders    POC Urinalysis Dipstick (Completed)    Rh negative, antepartum    Overview     Maternal blood type A negative   2021 RhoGam in ER for threatened AB         Endometriosis    Overview     ; diagnostic laparoscopy with treatment of endometriosis at 17 years of age for dysmenorrhea and abnormal bleeding.         History of  section complicating pregnancy    Overview     Prior C section for Breech; ; [place].   Patient does have a prior history of pelvic fracture.  Patient interested in  if no concern of placenta accreta.  Permit 11/10/21         Relevant Orders    [facility]    Pelvic pain    Overview      Midline severe cramping.   Started one week prior to missed menses.  Resolved around 16 weeks pregnancy.         Threatened     Overview     Positive episodes of heavy bleeding and severe midline cramping.  Cramping started prior to the onset of labor.  PDC Ultrasound 21 with subchorionic hemorrhage.         BV (bacterial vaginosis)    Overview     On prenatal labs.  Complaints of vaginal odor.  Has prescription for Flagyl to take after 10 to 12 weeks gestation.         Subchorionic hematoma in first trimester    Overview     [facility] ultrasound 2021.  Concern for morbidly adherent placenta.   21; MEDINA resolved.     PDC 7/15/21; MEDINA 4.2 x 6 cm. Second bleeding episode; ED 21.          Morbidly adherent placenta, antepartum    Overview     Concern for c section scar implantation.    PDC 21; Placenta previa; anterior. Myometrial thickness 5 mm; watch for PAS ( Placenta Accreta Syndrome)  PDC  u/s 7/15/21; post bleeding episode ; MEDINA 4 x 6 cm.;  Placenta anterior, low lying with good clear zone.  No evidence accreta at present.     PDC ultrasound 2021; placenta anterior low-lying.  No evidence of abnormal placentation; F/u PDC 10/21.     PDC ultrasound 10/21/2021 placenta extending down to area of old  scar.  No evidence of abnormal placentation or accreta.  Normal fetal growth with estimated fetal weight of 56 percentile.         Relevant Orders    [facility]    Low-lying placenta    Overview     Resolution of anterior placenta previa.          Current Assessment & Plan     Follow up PDC u/s at 36-37 wks.                1. Pregnancy at 35w2d; GBS next visit.  2. PDC f/u u/s next week to check placenta.  3. H/o prior C section; interested in  if placenta not a concern.   4. Fetal status reassuring. Denies contractions, pain or bleeding.   5. Activity and Exercise discussed.  Return in about 1 week (around 12/15/2021) for Next scheduled follow up.    Nico Ward MD  2021

## 2021-12-09 ENCOUNTER — TELEPHONE (OUTPATIENT)
Dept: OBSTETRICS AND GYNECOLOGY | Facility: CLINIC | Age: 31
End: 2021-12-09

## 2021-12-13 ENCOUNTER — OFFICE VISIT (OUTPATIENT)
Dept: OBSTETRICS AND GYNECOLOGY | Facility: HOSPITAL | Age: 31
End: 2021-12-13

## 2021-12-13 ENCOUNTER — HOSPITAL ENCOUNTER (OUTPATIENT)
Dept: WOMENS IMAGING | Facility: HOSPITAL | Age: 31
Discharge: HOME | End: 2021-12-13
Admitting: OBSTETRICS & GYNECOLOGY

## 2021-12-13 ENCOUNTER — ROUTINE PRENATAL (OUTPATIENT)
Dept: OBSTETRICS AND GYNECOLOGY | Facility: CLINIC | Age: 31
End: 2021-12-13

## 2021-12-13 VITALS
WEIGHT: 196.8 LBS | DIASTOLIC BLOOD PRESSURE: 66 MMHG | HEART RATE: 81 BPM | BODY MASS INDEX: 30.82 KG/M2 | SYSTOLIC BLOOD PRESSURE: 136 MMHG

## 2021-12-13 VITALS — BODY MASS INDEX: 30.76 KG/M2 | DIASTOLIC BLOOD PRESSURE: 62 MMHG | SYSTOLIC BLOOD PRESSURE: 118 MMHG | WEIGHT: 196.4 LBS

## 2021-12-13 DIAGNOSIS — R10.814 ABDOMINAL TENDERNESS OF LEFT LOWER QUADRANT, REBOUND TENDERNESS PRESENCE NOT SPECIFIED: ICD-10-CM

## 2021-12-13 DIAGNOSIS — O34.219 HISTORY OF CESAREAN SECTION COMPLICATING PREGNANCY: ICD-10-CM

## 2021-12-13 DIAGNOSIS — N76.0 BV (BACTERIAL VAGINOSIS): ICD-10-CM

## 2021-12-13 DIAGNOSIS — O20.0 THREATENED ABORTION: ICD-10-CM

## 2021-12-13 DIAGNOSIS — O41.8X10 SUBCHORIONIC HEMATOMA IN FIRST TRIMESTER, SINGLE OR UNSPECIFIED FETUS: ICD-10-CM

## 2021-12-13 DIAGNOSIS — O26.899 RH NEGATIVE, ANTEPARTUM: ICD-10-CM

## 2021-12-13 DIAGNOSIS — N80.9 ENDOMETRIOSIS: ICD-10-CM

## 2021-12-13 DIAGNOSIS — O44.40 LOW-LYING PLACENTA: ICD-10-CM

## 2021-12-13 DIAGNOSIS — Z67.91 RH NEGATIVE, ANTEPARTUM: ICD-10-CM

## 2021-12-13 DIAGNOSIS — Z3A.36 36 WEEKS GESTATION OF PREGNANCY: ICD-10-CM

## 2021-12-13 DIAGNOSIS — B96.89 BV (BACTERIAL VAGINOSIS): ICD-10-CM

## 2021-12-13 DIAGNOSIS — O46.8X1 SUBCHORIONIC HEMATOMA IN FIRST TRIMESTER, SINGLE OR UNSPECIFIED FETUS: ICD-10-CM

## 2021-12-13 DIAGNOSIS — R10.2 PELVIC PAIN: ICD-10-CM

## 2021-12-13 PROCEDURE — 87081 CULTURE SCREEN ONLY: CPT | Performed by: NURSE PRACTITIONER

## 2021-12-13 PROCEDURE — 76819 FETAL BIOPHYS PROFIL W/O NST: CPT

## 2021-12-13 PROCEDURE — 76816 OB US FOLLOW-UP PER FETUS: CPT

## 2021-12-13 PROCEDURE — 0502F SUBSEQUENT PRENATAL CARE: CPT | Performed by: NURSE PRACTITIONER

## 2021-12-13 PROCEDURE — 76816 OB US FOLLOW-UP PER FETUS: CPT | Performed by: OBSTETRICS & GYNECOLOGY

## 2021-12-13 PROCEDURE — 76819 FETAL BIOPHYS PROFIL W/O NST: CPT | Performed by: OBSTETRICS & GYNECOLOGY

## 2021-12-13 NOTE — PROGRESS NOTES
OB FOLLOW UP  CC- Here for care of pregnancy        Cooper Bah is a 31 y.o.  36w0d patient being seen today for her obstetrical follow up visit. Patient reports no complaints.     She denies LOF, vaginal spotting, headaches, vision changes, swelling or sandra navas/contractions.  She reports adequate fetal movements, >10 movements in 10 hours.    Her prenatal care is complicated by (and status) :    Patient Active Problem List   Diagnosis   • Hematemesis with nausea   • Weight loss   • Abdominal pain   • Liver mass   • Focal nodular hyperplasia of liver   • Pregnancy   • Rh negative, antepartum   • Endometriosis   • History of  section complicating pregnancy   • Pelvic pain   • Threatened    • BV (bacterial vaginosis)   • Abdominal tenderness of left lower quadrant   • Subchorionic hematoma in first trimester   • Morbidly adherent placenta, antepartum   • Low-lying placenta     Flu Status: Has not had for current flu season  GBS Status: Done Today  Her Delivery Plan is: Desires   Ultrasound Today: Yes, at [facility] prior to appt with us.  Findings showed per pt placenta and growth were normal and she has the green light for TOLAC.  I will evaluate the U/S once Dorothy releases it. ZAK Russell    ROS -   Patient Reports : No Problems  Patient Denies: Loss of Fluid, Vaginal Spotting, Vision Changes, Headaches, Nausea , Vomiting , Contractions and Epigastric pain  Fetal Movement : adequate  All other systems reviewed and are negative.       The additional following portions of the patient's history were reviewed and updated as appropriate: allergies, current medications, past family history, past medical history, past social history, past surgical history and problem list.    I have reviewed and agree with the HPI, ROS, and historical information as entered above. Sonia Nicholson, ZAK        /62   Wt 89.1 kg (196 lb 6.4 oz)   LMP 2021 (Approximate)   BMI 30.76  kg/m²     EXAM:     FHT: pos BPM     Pelvic Exam: Yes Dilation: Closed  Effacement: 75%  Station: -1    Urine glucose/protein: See prenatal flowsheet       Assessment and Plan    Problem List Items Addressed This Visit        Gravid and     Pregnancy    Overview     Received RhoGam in ER for bleeding on 2021.  Dates by 6 to 7-week ultrasound.  History of prior  for breech.  Discussed option of .  Persistent pelvic cramping.  Cell Free DNA screen low risk; consistent with male  Need AFP 4 at 16 wk.          Relevant Orders    POC Protein, Urine, Qualitative, Dipstick (Completed)    POC Glucose, Urine, Qualitative, Dipstick (Completed)    Group B Streptococcus Culture - Swab, Vaginal/Rectum    Rh negative, antepartum    Overview     Maternal blood type A negative   2021 RhoGam in ER for threatened AB         History of  section complicating pregnancy    Overview     Prior C section for Breech; ; [place].   Patient does have a prior history of pelvic fracture.  Patient interested in  if no concern of placenta accreta.  Permit 11/10/21         Morbidly adherent placenta, antepartum    Overview     Concern for c section scar implantation.    [facility] 21; Placenta previa; anterior. Myometrial thickness 5 mm; watch for PAS ( Placenta Accreta Syndrome)  [facility] u/s 7/15/21; post bleeding episode ; MEDINA 4 x 6 cm.;  Placenta anterior, low lying with good clear zone.  No evidence accreta at present.     [facility] ultrasound 2021; placenta anterior.  No evidence of abnormal placentation, EFW-48%    [facility] ultrasound 10/21/2021 placenta extending down to area of old  scar.  No evidence of abnormal placentation or accreta.  Normal fetal growth with estimated fetal weight of 56 percentile.         Low-lying placenta    Overview     Resolution of anterior placenta previa.                1. Pregnancy at 36w0d  2. Fetal status reassuring.   3. Activity and Exercise  discussed.  Return in about 1 week (around 12/20/2021) for CLAIRE CROWE   [facility] U/S normal today   Labor precautions and kick counts reviewed    Sonia Nicholson, APRN  12/13/2021

## 2021-12-13 NOTE — PROGRESS NOTES
Patient seen in Maternal Fetal Medicine clinic today. Please see full note in under imaging tab of patient chart in Epic (Viewpoint report).    Heidy De La Cruz MD

## 2021-12-15 PROBLEM — O99.820 GBS (GROUP B STREPTOCOCCUS CARRIER), +RV CULTURE, CURRENTLY PREGNANT: Status: ACTIVE | Noted: 2021-12-15

## 2021-12-15 LAB — BACTERIA SPEC AEROBE CULT: ABNORMAL

## 2021-12-17 ENCOUNTER — TELEPHONE (OUTPATIENT)
Dept: OBSTETRICS AND GYNECOLOGY | Facility: CLINIC | Age: 31
End: 2021-12-17

## 2021-12-22 ENCOUNTER — ROUTINE PRENATAL (OUTPATIENT)
Dept: OBSTETRICS AND GYNECOLOGY | Facility: CLINIC | Age: 31
End: 2021-12-22

## 2021-12-22 VITALS — BODY MASS INDEX: 31.23 KG/M2 | WEIGHT: 199.4 LBS | SYSTOLIC BLOOD PRESSURE: 110 MMHG | DIASTOLIC BLOOD PRESSURE: 65 MMHG

## 2021-12-22 DIAGNOSIS — B96.89 BV (BACTERIAL VAGINOSIS): ICD-10-CM

## 2021-12-22 DIAGNOSIS — N80.9 ENDOMETRIOSIS: ICD-10-CM

## 2021-12-22 DIAGNOSIS — Z34.90 PREGNANCY, UNSPECIFIED GESTATIONAL AGE: ICD-10-CM

## 2021-12-22 DIAGNOSIS — R10.2 PELVIC PAIN: ICD-10-CM

## 2021-12-22 DIAGNOSIS — N76.0 BV (BACTERIAL VAGINOSIS): ICD-10-CM

## 2021-12-22 DIAGNOSIS — O26.899 RH NEGATIVE, ANTEPARTUM: ICD-10-CM

## 2021-12-22 DIAGNOSIS — O41.8X10 SUBCHORIONIC HEMATOMA IN FIRST TRIMESTER, SINGLE OR UNSPECIFIED FETUS: ICD-10-CM

## 2021-12-22 DIAGNOSIS — Z3A.37 37 WEEKS GESTATION OF PREGNANCY: ICD-10-CM

## 2021-12-22 DIAGNOSIS — O20.0 THREATENED ABORTION: ICD-10-CM

## 2021-12-22 DIAGNOSIS — O46.8X1 SUBCHORIONIC HEMATOMA IN FIRST TRIMESTER, SINGLE OR UNSPECIFIED FETUS: ICD-10-CM

## 2021-12-22 DIAGNOSIS — O44.40 LOW-LYING PLACENTA: ICD-10-CM

## 2021-12-22 DIAGNOSIS — O34.219 HISTORY OF CESAREAN SECTION COMPLICATING PREGNANCY: Primary | ICD-10-CM

## 2021-12-22 DIAGNOSIS — Z67.91 RH NEGATIVE, ANTEPARTUM: ICD-10-CM

## 2021-12-22 LAB
GLUCOSE UR STRIP-MCNC: NEGATIVE MG/DL
PROT UR STRIP-MCNC: NEGATIVE MG/DL

## 2021-12-22 PROCEDURE — 99213 OFFICE O/P EST LOW 20 MIN: CPT | Performed by: OBSTETRICS & GYNECOLOGY

## 2021-12-22 NOTE — PROGRESS NOTES
OB FOLLOW UP  CC- Here for care of pregnancy        Cooper Bah is a 31 y.o.  37w2d patient being seen today for her obstetrical follow up visit. Patient reports no complaints.     Her prenatal care is complicated by (and status) :    Patient Active Problem List   Diagnosis   • Hematemesis with nausea   • Weight loss   • Abdominal pain   • Liver mass   • Focal nodular hyperplasia of liver   • Pregnancy   • Rh negative, antepartum   • Endometriosis   • History of  section complicating pregnancy   • Pelvic pain   • Threatened    • BV (bacterial vaginosis)   • Abdominal tenderness of left lower quadrant   • Subchorionic hematoma in first trimester   • Morbidly adherent placenta, antepartum   • Low-lying placenta   • GBS (group B Streptococcus carrier), +RV culture, currently pregnant         Flu Status: Declines  GBS Status: Was already done and it was positive.   Her Delivery Plan is:   Ultrasound Today: No.    ROS -   Patient Reports : No Problems  Patient Denies: Loss of Fluid, Vaginal Spotting, Vision Changes, Headaches, Nausea , Vomiting , Contractions and Epigastric pain  Fetal Movement : normal  All other systems reviewed and are negative.       The additional following portions of the patient's history were reviewed and updated as appropriate: allergies, current medications, past family history, past medical history, past social history, past surgical history and problem list.    I have reviewed and agree with the HPI, ROS, and historical information as entered above. Nico Ward MD        /65   Wt 90.4 kg (199 lb 6.4 oz)   LMP 2021 (Approximate)   BMI 31.23 kg/m²     EXAM:     FHT: 125 BPM   Uterine Size: size equals dates  Pelvic Exam: Yes Dilation: 1cm  Effacement: 75%  Station: -1    Urine glucose/protein: See prenatal flowsheet       Assessment and Plan    Problem List Items Addressed This Visit            Pregnancy    Overview     Received RhoGam in  ER for bleeding on 2021.  Dates by 6 to 7-week ultrasound.  History of prior  for breech.  Discussed option of .  Persistent pelvic cramping.  Cell Free DNA screen low risk; consistent with male  Need AFP 4 at 16 wk.   Induction of labor scheduled for Tuesday p.m.  at midnight.)COVID 1/2@145               Relevant Orders    POC Urinalysis Dipstick (Completed)    Rh negative, antepartum    Overview     Maternal blood type A negative   2021 RhoGam in ER for threatened AB         Endometriosis    Overview     ; diagnostic laparoscopy with treatment of endometriosis at 17 years of age for dysmenorrhea and abnormal bleeding.         History of  section complicating pregnancy - Primary    Overview     Prior C section for Breech; ; [place].   Patient does have a prior history of pelvic fracture.  Patient interested in  if no concern of placenta accreta.  Permit 11/10/21         Pelvic pain    Overview      Midline severe cramping.   Started one week prior to missed menses.  Resolved around 16 weeks pregnancy.         Threatened     Overview     Positive episodes of heavy bleeding and severe midline cramping.  Cramping started prior to the onset of labor.  [facility] Ultrasound 21 with subchorionic hemorrhage.         BV (bacterial vaginosis)    Overview     On prenatal labs.  Complaints of vaginal odor.  Has prescription for Flagyl to take after 10 to 12 weeks gestation.         Subchorionic hematoma in first trimester    Overview     [facility] ultrasound 2021.  Concern for morbidly adherent placenta.   21; MEDINA resolved.     [facility] 7/15/21; MEDINA 4.2 x 6 cm. Second bleeding episode; ED 21.          Morbidly adherent placenta, antepartum    Overview     Concern for c section scar implantation.    [facility] 21; Placenta previa; anterior. Myometrial thickness 5 mm; watch for PAS ( Placenta Accreta Syndrome)  [facility] u/s 7/15/21; post bleeding episode ; MEDINA 4 x 6  cm.;  Placenta anterior, low lying with good clear zone.  No evidence accreta at present.     [facility] ultrasound 2021; placenta anterior.  No evidence of abnormal placentation, EFW-48%    [facility] ultrasound 10/21/2021 placenta extending down to area of old  scar.  No evidence of abnormal placentation or accreta.  Normal fetal growth with estimated fetal weight of 56 percentile.         Low-lying placenta    Overview     Resolution of anterior placenta previa.                1. Pregnancy at 37w2d; cervix favorable for induction.  Scheduled for 3 9 weeks.  2. [facility] ultrasound with no evidence of adherent placenta.  3. Patient with prior .  Desires .  Permits signed.  4. Fetal status reassuring.   5. Activity and Exercise discussed.  Return in about 1 week (around 2021) for Next scheduled follow up.    Nico Ward MD  2021

## 2021-12-29 ENCOUNTER — HOSPITAL ENCOUNTER (INPATIENT)
Facility: HOSPITAL | Age: 31
LOS: 3 days | Discharge: HOME | End: 2022-01-01
Attending: OBSTETRICS & GYNECOLOGY | Admitting: OBSTETRICS & GYNECOLOGY

## 2021-12-29 ENCOUNTER — ROUTINE PRENATAL (OUTPATIENT)
Dept: OBSTETRICS AND GYNECOLOGY | Facility: CLINIC | Age: 31
End: 2021-12-29

## 2021-12-29 ENCOUNTER — PREP FOR SURGERY (OUTPATIENT)
Dept: OTHER | Facility: HOSPITAL | Age: 31
End: 2021-12-29

## 2021-12-29 VITALS — WEIGHT: 201 LBS | DIASTOLIC BLOOD PRESSURE: 70 MMHG | SYSTOLIC BLOOD PRESSURE: 120 MMHG | BODY MASS INDEX: 31.48 KG/M2

## 2021-12-29 DIAGNOSIS — Z3A.39 39 WEEKS GESTATION OF PREGNANCY: Primary | ICD-10-CM

## 2021-12-29 DIAGNOSIS — O26.899 RH NEGATIVE, ANTEPARTUM: ICD-10-CM

## 2021-12-29 DIAGNOSIS — Z67.91 RH NEGATIVE, ANTEPARTUM: ICD-10-CM

## 2021-12-29 DIAGNOSIS — R10.2 PELVIC PAIN: ICD-10-CM

## 2021-12-29 DIAGNOSIS — O34.219 HISTORY OF CESAREAN SECTION COMPLICATING PREGNANCY: ICD-10-CM

## 2021-12-29 DIAGNOSIS — O44.40 LOW-LYING PLACENTA: ICD-10-CM

## 2021-12-29 DIAGNOSIS — O99.820 GBS (GROUP B STREPTOCOCCUS CARRIER), +RV CULTURE, CURRENTLY PREGNANT: ICD-10-CM

## 2021-12-29 DIAGNOSIS — Z3A.38 38 WEEKS GESTATION OF PREGNANCY: Primary | ICD-10-CM

## 2021-12-29 DIAGNOSIS — D62 ANEMIA DUE TO ACUTE BLOOD LOSS: Primary | ICD-10-CM

## 2021-12-29 DIAGNOSIS — Z3A.39 39 WEEKS GESTATION OF PREGNANCY: ICD-10-CM

## 2021-12-29 PROBLEM — Z98.891 PREVIOUS CESAREAN SECTION: Status: ACTIVE | Noted: 2021-12-29

## 2021-12-29 LAB
ALP SERPL-CCNC: 126 U/L (ref 39–117)
ALT SERPL W P-5'-P-CCNC: 19 U/L (ref 1–33)
AST SERPL-CCNC: 18 U/L (ref 1–32)
BILIRUB SERPL-MCNC: 0.4 MG/DL (ref 0–1.2)
CREAT SERPL-MCNC: 0.51 MG/DL (ref 0.57–1)
DEPRECATED RDW RBC AUTO: 41.5 FL (ref 37–54)
ERYTHROCYTE [DISTWIDTH] IN BLOOD BY AUTOMATED COUNT: 12.5 % (ref 12.3–15.4)
GLUCOSE UR STRIP-MCNC: NEGATIVE MG/DL
HCT VFR BLD AUTO: 33.6 % (ref 34–46.6)
HGB BLD-MCNC: 11.6 G/DL (ref 12–15.9)
LDH SERPL-CCNC: 172 U/L (ref 135–214)
MCH RBC QN AUTO: 31.8 PG (ref 26.6–33)
MCHC RBC AUTO-ENTMCNC: 34.5 G/DL (ref 31.5–35.7)
MCV RBC AUTO: 92.1 FL (ref 79–97)
PLATELET # BLD AUTO: 315 10*3/MM3 (ref 140–450)
PMV BLD AUTO: 11.3 FL (ref 6–12)
PROT UR STRIP-MCNC: NEGATIVE MG/DL
RBC # BLD AUTO: 3.65 10*6/MM3 (ref 3.77–5.28)
SARS-COV-2 RDRP RESP QL NAA+PROBE: NORMAL
URATE SERPL-MCNC: 3.4 MG/DL (ref 2.4–5.7)
WBC NRBC COR # BLD: 15.44 10*3/MM3 (ref 3.4–10.8)

## 2021-12-29 PROCEDURE — 83615 LACTATE (LD) (LDH) ENZYME: CPT | Performed by: OBSTETRICS & GYNECOLOGY

## 2021-12-29 PROCEDURE — 86870 RBC ANTIBODY IDENTIFICATION: CPT | Performed by: OBSTETRICS & GYNECOLOGY

## 2021-12-29 PROCEDURE — 82565 ASSAY OF CREATININE: CPT | Performed by: OBSTETRICS & GYNECOLOGY

## 2021-12-29 PROCEDURE — 86920 COMPATIBILITY TEST SPIN: CPT

## 2021-12-29 PROCEDURE — 59025 FETAL NON-STRESS TEST: CPT

## 2021-12-29 PROCEDURE — 86900 BLOOD TYPING SEROLOGIC ABO: CPT | Performed by: OBSTETRICS & GYNECOLOGY

## 2021-12-29 PROCEDURE — 82247 BILIRUBIN TOTAL: CPT | Performed by: OBSTETRICS & GYNECOLOGY

## 2021-12-29 PROCEDURE — 84075 ASSAY ALKALINE PHOSPHATASE: CPT | Performed by: OBSTETRICS & GYNECOLOGY

## 2021-12-29 PROCEDURE — 0502F SUBSEQUENT PRENATAL CARE: CPT | Performed by: OBSTETRICS & GYNECOLOGY

## 2021-12-29 PROCEDURE — 84460 ALANINE AMINO (ALT) (SGPT): CPT | Performed by: OBSTETRICS & GYNECOLOGY

## 2021-12-29 PROCEDURE — 84450 TRANSFERASE (AST) (SGOT): CPT | Performed by: OBSTETRICS & GYNECOLOGY

## 2021-12-29 PROCEDURE — 85027 COMPLETE CBC AUTOMATED: CPT | Performed by: OBSTETRICS & GYNECOLOGY

## 2021-12-29 PROCEDURE — 86901 BLOOD TYPING SEROLOGIC RH(D): CPT | Performed by: OBSTETRICS & GYNECOLOGY

## 2021-12-29 PROCEDURE — 86850 RBC ANTIBODY SCREEN: CPT | Performed by: OBSTETRICS & GYNECOLOGY

## 2021-12-29 PROCEDURE — 87635 SARS-COV-2 COVID-19 AMP PRB: CPT | Performed by: OBSTETRICS & GYNECOLOGY

## 2021-12-29 PROCEDURE — 84550 ASSAY OF BLOOD/URIC ACID: CPT | Performed by: OBSTETRICS & GYNECOLOGY

## 2021-12-29 RX ORDER — MORPHINE SULFATE 2 MG/ML
2 INJECTION, SOLUTION INTRAMUSCULAR; INTRAVENOUS
Status: CANCELLED | OUTPATIENT
Start: 2021-12-29 | End: 2022-01-08

## 2021-12-29 RX ORDER — PROMETHAZINE HYDROCHLORIDE 12.5 MG/1
12.5 TABLET ORAL EVERY 6 HOURS PRN
Status: DISCONTINUED | OUTPATIENT
Start: 2021-12-29 | End: 2021-12-30 | Stop reason: HOSPADM

## 2021-12-29 RX ORDER — LIDOCAINE HYDROCHLORIDE 10 MG/ML
5 INJECTION, SOLUTION EPIDURAL; INFILTRATION; INTRACAUDAL; PERINEURAL AS NEEDED
Status: DISCONTINUED | OUTPATIENT
Start: 2021-12-29 | End: 2021-12-30 | Stop reason: HOSPADM

## 2021-12-29 RX ORDER — OXYTOCIN/0.9 % SODIUM CHLORIDE 30/500 ML
650 PLASTIC BAG, INJECTION (ML) INTRAVENOUS ONCE
Status: CANCELLED | OUTPATIENT
Start: 2021-12-30 | End: 2021-12-30

## 2021-12-29 RX ORDER — ERYTHROMYCIN 5 MG/G
OINTMENT OPHTHALMIC
Status: DISCONTINUED
Start: 2021-12-29 | End: 2022-01-01 | Stop reason: HOSPADM

## 2021-12-29 RX ORDER — ACETAMINOPHEN 325 MG/1
650 TABLET ORAL EVERY 4 HOURS PRN
Status: DISCONTINUED | OUTPATIENT
Start: 2021-12-29 | End: 2021-12-30 | Stop reason: HOSPADM

## 2021-12-29 RX ORDER — OXYCODONE HYDROCHLORIDE AND ACETAMINOPHEN 5; 325 MG/1; MG/1
2 TABLET ORAL EVERY 4 HOURS PRN
Status: CANCELLED | OUTPATIENT
Start: 2021-12-29 | End: 2022-01-08

## 2021-12-29 RX ORDER — METHYLERGONOVINE MALEATE 0.2 MG/ML
200 INJECTION INTRAVENOUS ONCE AS NEEDED
Status: CANCELLED | OUTPATIENT
Start: 2021-12-29

## 2021-12-29 RX ORDER — SODIUM CHLORIDE, SODIUM LACTATE, POTASSIUM CHLORIDE, CALCIUM CHLORIDE 600; 310; 30; 20 MG/100ML; MG/100ML; MG/100ML; MG/100ML
125 INJECTION, SOLUTION INTRAVENOUS CONTINUOUS
Status: DISCONTINUED | OUTPATIENT
Start: 2021-12-30 | End: 2021-12-30

## 2021-12-29 RX ORDER — SODIUM CHLORIDE 0.9 % (FLUSH) 0.9 %
10 SYRINGE (ML) INJECTION EVERY 12 HOURS SCHEDULED
Status: DISCONTINUED | OUTPATIENT
Start: 2021-12-30 | End: 2021-12-30 | Stop reason: HOSPADM

## 2021-12-29 RX ORDER — ONDANSETRON 4 MG/1
4 TABLET, FILM COATED ORAL EVERY 6 HOURS PRN
Status: CANCELLED | OUTPATIENT
Start: 2021-12-29

## 2021-12-29 RX ORDER — ACETAMINOPHEN 325 MG/1
650 TABLET ORAL EVERY 4 HOURS PRN
Status: CANCELLED | OUTPATIENT
Start: 2021-12-29

## 2021-12-29 RX ORDER — EPHEDRINE SULFATE 5 MG/ML
INJECTION INTRAVENOUS
Status: DISCONTINUED
Start: 2021-12-29 | End: 2022-01-01 | Stop reason: HOSPADM

## 2021-12-29 RX ORDER — SODIUM CHLORIDE, SODIUM LACTATE, POTASSIUM CHLORIDE, CALCIUM CHLORIDE 600; 310; 30; 20 MG/100ML; MG/100ML; MG/100ML; MG/100ML
125 INJECTION, SOLUTION INTRAVENOUS CONTINUOUS
Status: CANCELLED | OUTPATIENT
Start: 2021-12-29

## 2021-12-29 RX ORDER — MAGNESIUM CARB/ALUMINUM HYDROX 105-160MG
30 TABLET,CHEWABLE ORAL ONCE
Status: DISCONTINUED | OUTPATIENT
Start: 2021-12-30 | End: 2021-12-30 | Stop reason: HOSPADM

## 2021-12-29 RX ORDER — OXYTOCIN/0.9 % SODIUM CHLORIDE 30/500 ML
650 PLASTIC BAG, INJECTION (ML) INTRAVENOUS ONCE
Status: CANCELLED | OUTPATIENT
Start: 2021-12-29 | End: 2021-12-29

## 2021-12-29 RX ORDER — MAGNESIUM CARB/ALUMINUM HYDROX 105-160MG
30 TABLET,CHEWABLE ORAL ONCE
Status: CANCELLED | OUTPATIENT
Start: 2021-12-29 | End: 2021-12-29

## 2021-12-29 RX ORDER — ONDANSETRON 2 MG/ML
4 INJECTION INTRAMUSCULAR; INTRAVENOUS EVERY 6 HOURS PRN
Status: DISCONTINUED | OUTPATIENT
Start: 2021-12-29 | End: 2021-12-30 | Stop reason: HOSPADM

## 2021-12-29 RX ORDER — IBUPROFEN 600 MG/1
600 TABLET ORAL EVERY 6 HOURS PRN
Status: CANCELLED | OUTPATIENT
Start: 2021-12-29

## 2021-12-29 RX ORDER — MISOPROSTOL 200 UG/1
800 TABLET ORAL AS NEEDED
Status: CANCELLED | OUTPATIENT
Start: 2021-12-29

## 2021-12-29 RX ORDER — ONDANSETRON 2 MG/ML
4 INJECTION INTRAMUSCULAR; INTRAVENOUS EVERY 6 HOURS PRN
Status: CANCELLED | OUTPATIENT
Start: 2021-12-29

## 2021-12-29 RX ORDER — SODIUM CHLORIDE 0.9 % (FLUSH) 0.9 %
10 SYRINGE (ML) INJECTION AS NEEDED
Status: DISCONTINUED | OUTPATIENT
Start: 2021-12-29 | End: 2021-12-30 | Stop reason: HOSPADM

## 2021-12-29 RX ORDER — ONDANSETRON 4 MG/1
4 TABLET, FILM COATED ORAL EVERY 6 HOURS PRN
Status: DISCONTINUED | OUTPATIENT
Start: 2021-12-29 | End: 2021-12-30 | Stop reason: HOSPADM

## 2021-12-29 RX ORDER — PROMETHAZINE HYDROCHLORIDE 12.5 MG/1
12.5 TABLET ORAL EVERY 6 HOURS PRN
Status: CANCELLED | OUTPATIENT
Start: 2021-12-29

## 2021-12-29 RX ORDER — OXYTOCIN/0.9 % SODIUM CHLORIDE 30/500 ML
2-20 PLASTIC BAG, INJECTION (ML) INTRAVENOUS
Status: CANCELLED | OUTPATIENT
Start: 2021-12-29

## 2021-12-29 RX ORDER — BUTORPHANOL TARTRATE 1 MG/ML
1 INJECTION, SOLUTION INTRAMUSCULAR; INTRAVENOUS
Status: CANCELLED | OUTPATIENT
Start: 2021-12-29

## 2021-12-29 RX ORDER — PENICILLIN G 3000000 [IU]/50ML
3 INJECTION, SOLUTION INTRAVENOUS EVERY 4 HOURS
Status: CANCELLED | OUTPATIENT
Start: 2021-12-29 | End: 2021-12-30

## 2021-12-29 RX ORDER — SODIUM CHLORIDE 0.9 % (FLUSH) 0.9 %
3 SYRINGE (ML) INJECTION EVERY 12 HOURS SCHEDULED
Status: CANCELLED | OUTPATIENT
Start: 2021-12-29

## 2021-12-29 RX ORDER — OXYTOCIN/0.9 % SODIUM CHLORIDE 30/500 ML
85 PLASTIC BAG, INJECTION (ML) INTRAVENOUS ONCE
Status: CANCELLED | OUTPATIENT
Start: 2021-12-29 | End: 2021-12-29

## 2021-12-29 RX ORDER — OXYTOCIN/0.9 % SODIUM CHLORIDE 30/500 ML
85 PLASTIC BAG, INJECTION (ML) INTRAVENOUS ONCE
Status: CANCELLED | OUTPATIENT
Start: 2021-12-30 | End: 2021-12-30

## 2021-12-29 RX ORDER — PROMETHAZINE HYDROCHLORIDE 12.5 MG/1
12.5 SUPPOSITORY RECTAL EVERY 6 HOURS PRN
Status: CANCELLED | OUTPATIENT
Start: 2021-12-29

## 2021-12-29 RX ORDER — LIDOCAINE HYDROCHLORIDE 10 MG/ML
5 INJECTION, SOLUTION EPIDURAL; INFILTRATION; INTRACAUDAL; PERINEURAL AS NEEDED
Status: CANCELLED | OUTPATIENT
Start: 2021-12-29

## 2021-12-29 RX ORDER — PENICILLIN G 3000000 [IU]/50ML
3 INJECTION, SOLUTION INTRAVENOUS EVERY 4 HOURS
Status: DISCONTINUED | OUTPATIENT
Start: 2021-12-30 | End: 2021-12-30 | Stop reason: HOSPADM

## 2021-12-29 RX ORDER — SODIUM CHLORIDE 0.9 % (FLUSH) 0.9 %
3-10 SYRINGE (ML) INJECTION AS NEEDED
Status: CANCELLED | OUTPATIENT
Start: 2021-12-29

## 2021-12-29 RX ORDER — CARBOPROST TROMETHAMINE 250 UG/ML
250 INJECTION, SOLUTION INTRAMUSCULAR AS NEEDED
Status: CANCELLED | OUTPATIENT
Start: 2021-12-29

## 2021-12-29 RX ADMIN — SODIUM CHLORIDE 5 MILLION UNITS: 900 INJECTION INTRAVENOUS at 23:28

## 2021-12-29 RX ADMIN — SODIUM CHLORIDE, POTASSIUM CHLORIDE, SODIUM LACTATE AND CALCIUM CHLORIDE 125 ML/HR: 600; 310; 30; 20 INJECTION, SOLUTION INTRAVENOUS at 23:12

## 2021-12-29 NOTE — PROGRESS NOTES
"OB FOLLOW UP  CC- Here for care of pregnancy        Cooper Bah is a 31 y.o.  38w2d patient being seen today for her obstetrical follow up visit. Patient reports backache and fatigue. Patient states that she is having BH contractions. She says that the are about 18-24 minutes apart and then they \"taper out\". She reports that it happens in the middle of the night. She also reports pelvic pressure. She says that she is also having discharge. Patient GBS positive. Rhogam was given on 10/22/2021. Patient received Tdap vaccination on 10/22/2021. Patient is requesting to be checked today to see if she has dilated.     Her prenatal care is complicated by (and status) :    Patient Active Problem List   Diagnosis   • Hematemesis with nausea   • Weight loss   • Abdominal pain   • Liver mass   • Focal nodular hyperplasia of liver   • Pregnancy   • Rh negative, antepartum   • Endometriosis   • History of  section complicating pregnancy   • Pelvic pain   • Threatened    • BV (bacterial vaginosis)   • Abdominal tenderness of left lower quadrant   • Subchorionic hematoma in first trimester   • Morbidly adherent placenta, antepartum   • Low-lying placenta   • GBS (group B Streptococcus carrier), +RV culture, currently pregnant         Flu Status: Declines  GBS Status: Was already done and it was positive.   Her Delivery Plan is: Desires IOL at 39wks. Scheduled  Ultrasound Today: No.    ROS -   Patient Reports : Contractions  Patient Denies: Loss of Fluid, Vaginal Spotting, Vision Changes, Headaches, Nausea  and Vomiting   Fetal Movement : normal  All other systems reviewed and are negative.       The additional following portions of the patient's history were reviewed and updated as appropriate: allergies, current medications, past family history, past medical history, past social history, past surgical history and problem list.    I have reviewed and agree with the HPI, ROS, and historical information as " entered above. Nico Ward MD        /70   Wt 91.2 kg (201 lb)   LMP 2021 (Approximate)   BMI 31.48 kg/m²     EXAM:     FHT: 128 BPM   Uterine Size: Not examined  Pelvic Exam: Yes Dilation: 1cm  Effacement: 75%  Station: 0    Urine glucose/protein: See prenatal flowsheet  Clean-catch urinalysis was negative except for trace leukocytes.     Assessment and Plan    Problem List Items Addressed This Visit            Pregnancy - Primary    Overview     Received RhoGam in ER for bleeding on 2021.  Dates by 6 to 7-week ultrasound.  History of prior  for breech.  Discussed option of .  Persistent pelvic cramping.  Cell Free DNA screen low risk; consistent with male  Need AFP 4 at 16 wk.   Induction of labor scheduled for Tuesday p.m.  at midnight.)COVID @145               Relevant Orders    POC Urinalysis Dipstick (Completed)    Rh negative, antepartum    Overview     Maternal blood type A negative   2021 RhoGam in ER for threatened AB         History of  section complicating pregnancy    Overview     Prior C section for Breech; ; [place].   Patient does have a prior history of pelvic fracture.  Patient interested in  if no concern of placenta accreta.  Permit 11/10/21         Pelvic pain    Overview      Midline severe cramping.   Started one week prior to missed menses.  Resolved around 16 weeks pregnancy.         Morbidly adherent placenta, antepartum    Overview     Concern for c section scar implantation.    [facility] 21; Placenta previa; anterior. Myometrial thickness 5 mm; watch for PAS ( Placenta Accreta Syndrome)  [facility] u/s 7/15/21; post bleeding episode ; MEDINA 4 x 6 cm.;  Placenta anterior, low lying with good clear zone.  No evidence accreta at present.     [facility] ultrasound 2021; placenta anterior.  No evidence of abnormal placentation, EFW-48%    [facility] ultrasound 10/21/2021 placenta extending down to area of old  scar.  No  evidence of abnormal placentation or accreta.  Normal fetal growth with estimated fetal weight of 56 percentile.         Low-lying placenta    Overview     Resolution of anterior placenta previa.          GBS (group B Streptococcus carrier), +RV culture, currently pregnant          1. Pregnancy at 38w2d.  2. History of prior .  Patient desires trial of labor and .  Cervix favorable for induction.  3. Fetal status reassuring.  Induction of labor instructions and Covid testing instructions reviewed.    4. Activity and Exercise discussed.  Labor precautions reviewed.  No follow-ups on file.    Nico Ward MD  2021

## 2021-12-30 ENCOUNTER — ANESTHESIA EVENT (OUTPATIENT)
Dept: LABOR AND DELIVERY | Facility: HOSPITAL | Age: 31
End: 2021-12-30

## 2021-12-30 ENCOUNTER — ANESTHESIA (OUTPATIENT)
Dept: LABOR AND DELIVERY | Facility: HOSPITAL | Age: 31
End: 2021-12-30

## 2021-12-30 PROBLEM — O45.93 PLACENTAL ABRUPTION, THIRD TRIMESTER: Status: ACTIVE | Noted: 2021-12-30

## 2021-12-30 LAB
ABO GROUP BLD: NORMAL
ATMOSPHERIC PRESS: ABNORMAL MM[HG]
ATMOSPHERIC PRESS: ABNORMAL MM[HG]
BASE EXCESS BLDCOA CALC-SCNC: -3.9 MMOL/L (ref 0–2)
BASE EXCESS BLDCOV CALC-SCNC: 0.4 MMOL/L (ref 0–2)
BDY SITE: ABNORMAL
BDY SITE: ABNORMAL
BLD GP AB SCN SERPL QL: POSITIVE
BODY TEMPERATURE: 37 C
BODY TEMPERATURE: 37 C
CO2 BLDA-SCNC: 26.5 MMOL/L (ref 22–33)
CO2 BLDA-SCNC: 28.6 MMOL/L (ref 22–33)
COLLECT TME SMN: ABNORMAL
DEPRECATED RDW RBC AUTO: 43.7 FL (ref 37–54)
EPAP: 0
EPAP: 0
ERYTHROCYTE [DISTWIDTH] IN BLOOD BY AUTOMATED COUNT: 12.5 % (ref 12.3–15.4)
FIBRINOGEN PPP-MCNC: 341 MG/DL (ref 220–470)
HCO3 BLDCOA-SCNC: 24.7 MMOL/L (ref 16.9–20.5)
HCO3 BLDCOV-SCNC: 27.1 MMOL/L (ref 18.6–21.4)
HCT VFR BLD AUTO: 24.2 % (ref 34–46.6)
HGB BLD-MCNC: 8 G/DL (ref 12–15.9)
HGB BLDA-MCNC: 17.2 G/DL (ref 14–18)
HGB BLDA-MCNC: 17.7 G/DL (ref 14–18)
INHALED O2 CONCENTRATION: 21 %
INHALED O2 CONCENTRATION: 21 %
IPAP: 0
IPAP: 0
MCH RBC QN AUTO: 31.7 PG (ref 26.6–33)
MCHC RBC AUTO-ENTMCNC: 33.1 G/DL (ref 31.5–35.7)
MCV RBC AUTO: 96 FL (ref 79–97)
MODALITY: ABNORMAL
MODALITY: ABNORMAL
NOTE: ABNORMAL
NOTE: ABNORMAL
PAW @ PEAK INSP FLOW SETTING VENT: 0 CMH2O
PAW @ PEAK INSP FLOW SETTING VENT: 0 CMH2O
PCO2 BLDCOA: 57 MMHG (ref 43.3–54.9)
PCO2 BLDCOV: 49.6 MM HG (ref 28–40)
PH BLDCOA: 7.25 PH UNITS (ref 7.22–7.3)
PH BLDCOV: 7.35 PH UNITS (ref 7.31–7.37)
PLATELET # BLD AUTO: 234 10*3/MM3 (ref 140–450)
PMV BLD AUTO: 11.4 FL (ref 6–12)
PO2 BLDCOA: 20.5 MMHG (ref 11.5–43.3)
PO2 BLDCOV: 17.8 MM HG (ref 21–31)
RBC # BLD AUTO: 2.52 10*6/MM3 (ref 3.77–5.28)
RESIDUAL RHIG DETECTED: NORMAL
RH BLD: NEGATIVE
SAO2 % BLDCOA: 37.8 %
SAO2 % BLDCOA: ABNORMAL %
SAO2 % BLDCOV: 40.5 %
T&S EXPIRATION DATE: NORMAL
TOTAL RATE: 0 BREATHS/MINUTE
TOTAL RATE: 0 BREATHS/MINUTE
VENTILATOR MODE: ABNORMAL
VENTILATOR MODE: ABNORMAL
WBC NRBC COR # BLD: 18.62 10*3/MM3 (ref 3.4–10.8)

## 2021-12-30 PROCEDURE — 59510 CESAREAN DELIVERY: CPT | Performed by: OBSTETRICS & GYNECOLOGY

## 2021-12-30 PROCEDURE — 0 CEFAZOLIN IN DEXTROSE 2-4 GM/100ML-% SOLUTION: Performed by: OBSTETRICS & GYNECOLOGY

## 2021-12-30 PROCEDURE — 25010000002 FENTANYL CITRATE (PF) 50 MCG/ML SOLUTION: Performed by: NURSE ANESTHETIST, CERTIFIED REGISTERED

## 2021-12-30 PROCEDURE — C1755 CATHETER, INTRASPINAL: HCPCS

## 2021-12-30 PROCEDURE — 85027 COMPLETE CBC AUTOMATED: CPT | Performed by: OBSTETRICS & GYNECOLOGY

## 2021-12-30 PROCEDURE — 88307 TISSUE EXAM BY PATHOLOGIST: CPT | Performed by: OBSTETRICS & GYNECOLOGY

## 2021-12-30 PROCEDURE — C1755 CATHETER, INTRASPINAL: HCPCS | Performed by: ANESTHESIOLOGY

## 2021-12-30 PROCEDURE — 85384 FIBRINOGEN ACTIVITY: CPT | Performed by: OBSTETRICS & GYNECOLOGY

## 2021-12-30 PROCEDURE — 25010000002 MORPHINE PER 10 MG: Performed by: ANESTHESIOLOGY

## 2021-12-30 PROCEDURE — 25010000002 PENICILLIN G POTASSIUM PER 600000 UNITS: Performed by: OBSTETRICS & GYNECOLOGY

## 2021-12-30 PROCEDURE — 25010000002 METOCLOPRAMIDE PER 10 MG: Performed by: ANESTHESIOLOGY

## 2021-12-30 PROCEDURE — 25010000002 KETOROLAC TROMETHAMINE PER 15 MG: Performed by: OBSTETRICS & GYNECOLOGY

## 2021-12-30 PROCEDURE — 25010000002 ONDANSETRON PER 1 MG: Performed by: ANESTHESIOLOGY

## 2021-12-30 PROCEDURE — 25010000002 ROPIVACAINE PER 1 MG: Performed by: NURSE ANESTHETIST, CERTIFIED REGISTERED

## 2021-12-30 PROCEDURE — 25010000002 METHYLERGONOVINE MALEATE PER 0.2 MG: Performed by: OBSTETRICS & GYNECOLOGY

## 2021-12-30 PROCEDURE — 0 CEFAZOLIN IN DEXTROSE 2-4 GM/100ML-% SOLUTION: Performed by: ANESTHESIOLOGY

## 2021-12-30 PROCEDURE — 82805 BLOOD GASES W/O2 SATURATION: CPT

## 2021-12-30 PROCEDURE — 51702 INSERT TEMP BLADDER CATH: CPT

## 2021-12-30 PROCEDURE — 25010000002 MORPHINE PER 10 MG: Performed by: OBSTETRICS & GYNECOLOGY

## 2021-12-30 PROCEDURE — 59025 FETAL NON-STRESS TEST: CPT

## 2021-12-30 RX ORDER — METOCLOPRAMIDE HYDROCHLORIDE 5 MG/ML
INJECTION INTRAMUSCULAR; INTRAVENOUS AS NEEDED
Status: DISCONTINUED | OUTPATIENT
Start: 2021-12-30 | End: 2021-12-30 | Stop reason: SURG

## 2021-12-30 RX ORDER — ACETAMINOPHEN 325 MG/1
650 TABLET ORAL EVERY 6 HOURS
Status: DISCONTINUED | OUTPATIENT
Start: 2021-12-31 | End: 2022-01-01 | Stop reason: HOSPADM

## 2021-12-30 RX ORDER — OXYTOCIN/0.9 % SODIUM CHLORIDE 30/500 ML
2-20 PLASTIC BAG, INJECTION (ML) INTRAVENOUS
Status: DISCONTINUED | OUTPATIENT
Start: 2021-12-30 | End: 2021-12-30 | Stop reason: HOSPADM

## 2021-12-30 RX ORDER — TRISODIUM CITRATE DIHYDRATE AND CITRIC ACID MONOHYDRATE 500; 334 MG/5ML; MG/5ML
30 SOLUTION ORAL ONCE
Status: DISCONTINUED | OUTPATIENT
Start: 2021-12-30 | End: 2021-12-30 | Stop reason: HOSPADM

## 2021-12-30 RX ORDER — ROPIVACAINE HYDROCHLORIDE 2 MG/ML
15 INJECTION, SOLUTION EPIDURAL; INFILTRATION; PERINEURAL CONTINUOUS
Status: DISCONTINUED | OUTPATIENT
Start: 2021-12-30 | End: 2021-12-30

## 2021-12-30 RX ORDER — FAMOTIDINE 10 MG/ML
20 INJECTION, SOLUTION INTRAVENOUS ONCE AS NEEDED
Status: DISCONTINUED | OUTPATIENT
Start: 2021-12-30 | End: 2021-12-30 | Stop reason: HOSPADM

## 2021-12-30 RX ORDER — CEFAZOLIN SODIUM 2 G/100ML
2 INJECTION, SOLUTION INTRAVENOUS EVERY 8 HOURS
Status: COMPLETED | OUTPATIENT
Start: 2021-12-30 | End: 2021-12-30

## 2021-12-30 RX ORDER — METOCLOPRAMIDE HYDROCHLORIDE 5 MG/ML
10 INJECTION INTRAMUSCULAR; INTRAVENOUS ONCE AS NEEDED
Status: DISCONTINUED | OUTPATIENT
Start: 2021-12-30 | End: 2021-12-30 | Stop reason: HOSPADM

## 2021-12-30 RX ORDER — LIDOCAINE HYDROCHLORIDE AND EPINEPHRINE BITARTRATE 20; .01 MG/ML; MG/ML
INJECTION, SOLUTION SUBCUTANEOUS AS NEEDED
Status: DISCONTINUED | OUTPATIENT
Start: 2021-12-30 | End: 2021-12-30 | Stop reason: SURG

## 2021-12-30 RX ORDER — PRENATAL VIT/IRON FUM/FOLIC AC 27MG-0.8MG
1 TABLET ORAL DAILY
Status: DISCONTINUED | OUTPATIENT
Start: 2021-12-30 | End: 2022-01-01 | Stop reason: HOSPADM

## 2021-12-30 RX ORDER — ALUMINA, MAGNESIA, AND SIMETHICONE 2400; 2400; 240 MG/30ML; MG/30ML; MG/30ML
15 SUSPENSION ORAL EVERY 4 HOURS PRN
Status: DISCONTINUED | OUTPATIENT
Start: 2021-12-30 | End: 2022-01-01 | Stop reason: HOSPADM

## 2021-12-30 RX ORDER — LIDOCAINE HYDROCHLORIDE AND EPINEPHRINE 15; 5 MG/ML; UG/ML
INJECTION, SOLUTION EPIDURAL AS NEEDED
Status: DISCONTINUED | OUTPATIENT
Start: 2021-12-30 | End: 2021-12-30 | Stop reason: SURG

## 2021-12-30 RX ORDER — FAMOTIDINE 10 MG/ML
INJECTION, SOLUTION INTRAVENOUS AS NEEDED
Status: DISCONTINUED | OUTPATIENT
Start: 2021-12-30 | End: 2021-12-30 | Stop reason: SURG

## 2021-12-30 RX ORDER — IBUPROFEN 600 MG/1
600 TABLET ORAL EVERY 6 HOURS
Status: DISCONTINUED | OUTPATIENT
Start: 2022-01-01 | End: 2022-01-01 | Stop reason: HOSPADM

## 2021-12-30 RX ORDER — METHYLERGONOVINE MALEATE 0.2 MG/ML
200 INJECTION INTRAVENOUS ONCE AS NEEDED
Status: COMPLETED | OUTPATIENT
Start: 2021-12-30 | End: 2021-12-30

## 2021-12-30 RX ORDER — ACETAMINOPHEN 500 MG
1000 TABLET ORAL EVERY 6 HOURS
Status: COMPLETED | OUTPATIENT
Start: 2021-12-30 | End: 2021-12-31

## 2021-12-30 RX ORDER — OXYCODONE HYDROCHLORIDE 5 MG/1
5 TABLET ORAL EVERY 6 HOURS PRN
Status: DISCONTINUED | OUTPATIENT
Start: 2021-12-30 | End: 2022-01-01 | Stop reason: SDUPTHER

## 2021-12-30 RX ORDER — SODIUM CHLORIDE, SODIUM LACTATE, POTASSIUM CHLORIDE, CALCIUM CHLORIDE 600; 310; 30; 20 MG/100ML; MG/100ML; MG/100ML; MG/100ML
125 INJECTION, SOLUTION INTRAVENOUS CONTINUOUS
Status: DISCONTINUED | OUTPATIENT
Start: 2021-12-30 | End: 2022-01-01 | Stop reason: HOSPADM

## 2021-12-30 RX ORDER — DIPHENHYDRAMINE HCL 25 MG
25 CAPSULE ORAL EVERY 4 HOURS PRN
Status: DISCONTINUED | OUTPATIENT
Start: 2021-12-30 | End: 2022-01-01 | Stop reason: HOSPADM

## 2021-12-30 RX ORDER — DOCUSATE SODIUM 100 MG/1
100 CAPSULE, LIQUID FILLED ORAL 2 TIMES DAILY PRN
Status: DISCONTINUED | OUTPATIENT
Start: 2021-12-30 | End: 2022-01-01 | Stop reason: HOSPADM

## 2021-12-30 RX ORDER — MORPHINE SULFATE 1 MG/ML
INJECTION, SOLUTION EPIDURAL; INTRATHECAL; INTRAVENOUS AS NEEDED
Status: DISCONTINUED | OUTPATIENT
Start: 2021-12-30 | End: 2021-12-30 | Stop reason: SURG

## 2021-12-30 RX ORDER — OXYTOCIN-SODIUM CHLORIDE 0.9% IV SOLN 30 UNIT/500ML 30-0.9/5 UT/ML-%
SOLUTION INTRAVENOUS AS NEEDED
Status: DISCONTINUED | OUTPATIENT
Start: 2021-12-30 | End: 2021-12-30 | Stop reason: SURG

## 2021-12-30 RX ORDER — ONDANSETRON 2 MG/ML
4 INJECTION INTRAMUSCULAR; INTRAVENOUS ONCE AS NEEDED
Status: DISCONTINUED | OUTPATIENT
Start: 2021-12-30 | End: 2021-12-30 | Stop reason: HOSPADM

## 2021-12-30 RX ORDER — MISOPROSTOL 200 UG/1
TABLET ORAL
Status: COMPLETED
Start: 2021-12-30 | End: 2021-12-30

## 2021-12-30 RX ORDER — SIMETHICONE 80 MG
80 TABLET,CHEWABLE ORAL 4 TIMES DAILY PRN
Status: DISCONTINUED | OUTPATIENT
Start: 2021-12-30 | End: 2022-01-01 | Stop reason: HOSPADM

## 2021-12-30 RX ORDER — FENTANYL CITRATE 50 UG/ML
INJECTION, SOLUTION INTRAMUSCULAR; INTRAVENOUS AS NEEDED
Status: DISCONTINUED | OUTPATIENT
Start: 2021-12-30 | End: 2021-12-30 | Stop reason: SURG

## 2021-12-30 RX ORDER — EPHEDRINE SULFATE 5 MG/ML
10 INJECTION INTRAVENOUS
Status: DISCONTINUED | OUTPATIENT
Start: 2021-12-30 | End: 2021-12-30 | Stop reason: HOSPADM

## 2021-12-30 RX ORDER — MISOPROSTOL 200 UG/1
800 TABLET ORAL ONCE
Status: COMPLETED | OUTPATIENT
Start: 2021-12-30 | End: 2021-12-30

## 2021-12-30 RX ORDER — CEFAZOLIN SODIUM 2 G/100ML
INJECTION, SOLUTION INTRAVENOUS AS NEEDED
Status: DISCONTINUED | OUTPATIENT
Start: 2021-12-30 | End: 2021-12-30 | Stop reason: SURG

## 2021-12-30 RX ORDER — CEFAZOLIN SODIUM 2 G/100ML
INJECTION, SOLUTION INTRAVENOUS
Status: COMPLETED
Start: 2021-12-30 | End: 2021-12-30

## 2021-12-30 RX ORDER — KETOROLAC TROMETHAMINE 30 MG/ML
15 INJECTION, SOLUTION INTRAMUSCULAR; INTRAVENOUS EVERY 6 HOURS
Status: COMPLETED | OUTPATIENT
Start: 2021-12-31 | End: 2021-12-31

## 2021-12-30 RX ORDER — CARBOPROST TROMETHAMINE 250 UG/ML
250 INJECTION, SOLUTION INTRAMUSCULAR AS NEEDED
Status: DISCONTINUED | OUTPATIENT
Start: 2021-12-30 | End: 2022-01-01 | Stop reason: HOSPADM

## 2021-12-30 RX ORDER — ONDANSETRON 2 MG/ML
4 INJECTION INTRAMUSCULAR; INTRAVENOUS EVERY 6 HOURS PRN
Status: DISCONTINUED | OUTPATIENT
Start: 2021-12-30 | End: 2022-01-01 | Stop reason: HOSPADM

## 2021-12-30 RX ORDER — ACETAMINOPHEN 500 MG
1000 TABLET ORAL ONCE
Status: DISCONTINUED | OUTPATIENT
Start: 2021-12-30 | End: 2021-12-30 | Stop reason: HOSPADM

## 2021-12-30 RX ORDER — OXYTOCIN/0.9 % SODIUM CHLORIDE 30/500 ML
650 PLASTIC BAG, INJECTION (ML) INTRAVENOUS ONCE
Status: COMPLETED | OUTPATIENT
Start: 2021-12-30 | End: 2021-12-30

## 2021-12-30 RX ORDER — SODIUM CHLORIDE, SODIUM LACTATE, POTASSIUM CHLORIDE, CALCIUM CHLORIDE 600; 310; 30; 20 MG/100ML; MG/100ML; MG/100ML; MG/100ML
INJECTION, SOLUTION INTRAVENOUS CONTINUOUS PRN
Status: DISCONTINUED | OUTPATIENT
Start: 2021-12-30 | End: 2021-12-30 | Stop reason: SURG

## 2021-12-30 RX ORDER — HYDROCORTISONE 25 MG/G
1 CREAM TOPICAL AS NEEDED
Status: DISCONTINUED | OUTPATIENT
Start: 2021-12-30 | End: 2022-01-01 | Stop reason: HOSPADM

## 2021-12-30 RX ORDER — OXYTOCIN/0.9 % SODIUM CHLORIDE 30/500 ML
PLASTIC BAG, INJECTION (ML) INTRAVENOUS
Status: COMPLETED
Start: 2021-12-30 | End: 2021-12-30

## 2021-12-30 RX ORDER — OXYCODONE HYDROCHLORIDE 5 MG/1
10 TABLET ORAL EVERY 6 HOURS PRN
Status: DISCONTINUED | OUTPATIENT
Start: 2021-12-30 | End: 2022-01-01 | Stop reason: HOSPADM

## 2021-12-30 RX ORDER — ONDANSETRON 2 MG/ML
INJECTION INTRAMUSCULAR; INTRAVENOUS AS NEEDED
Status: DISCONTINUED | OUTPATIENT
Start: 2021-12-30 | End: 2021-12-30 | Stop reason: SURG

## 2021-12-30 RX ORDER — DIPHENHYDRAMINE HYDROCHLORIDE 50 MG/ML
12.5 INJECTION INTRAMUSCULAR; INTRAVENOUS EVERY 8 HOURS PRN
Status: DISCONTINUED | OUTPATIENT
Start: 2021-12-30 | End: 2021-12-30 | Stop reason: HOSPADM

## 2021-12-30 RX ORDER — OXYTOCIN/0.9 % SODIUM CHLORIDE 30/500 ML
85 PLASTIC BAG, INJECTION (ML) INTRAVENOUS ONCE
Status: COMPLETED | OUTPATIENT
Start: 2021-12-30 | End: 2021-12-30

## 2021-12-30 RX ORDER — KETOROLAC TROMETHAMINE 30 MG/ML
30 INJECTION, SOLUTION INTRAMUSCULAR; INTRAVENOUS ONCE
Status: COMPLETED | OUTPATIENT
Start: 2021-12-30 | End: 2021-12-30

## 2021-12-30 RX ORDER — CALCIUM CARBONATE 200(500)MG
1 TABLET,CHEWABLE ORAL EVERY 4 HOURS PRN
Status: DISCONTINUED | OUTPATIENT
Start: 2021-12-30 | End: 2022-01-01 | Stop reason: HOSPADM

## 2021-12-30 RX ORDER — MORPHINE SULFATE 4 MG/ML
4 INJECTION, SOLUTION INTRAMUSCULAR; INTRAVENOUS ONCE
Status: COMPLETED | OUTPATIENT
Start: 2021-12-30 | End: 2021-12-30

## 2021-12-30 RX ADMIN — SODIUM CHLORIDE, POTASSIUM CHLORIDE, SODIUM LACTATE AND CALCIUM CHLORIDE 125 ML/HR: 600; 310; 30; 20 INJECTION, SOLUTION INTRAVENOUS at 20:56

## 2021-12-30 RX ADMIN — PENICILLIN G 3 MILLION UNITS: 3000000 INJECTION, SOLUTION INTRAVENOUS at 12:23

## 2021-12-30 RX ADMIN — Medication 85 ML/HR: at 16:50

## 2021-12-30 RX ADMIN — FENTANYL CITRATE 100 MCG: 50 INJECTION, SOLUTION INTRAMUSCULAR; INTRAVENOUS at 14:50

## 2021-12-30 RX ADMIN — Medication 650 ML/HR: at 16:00

## 2021-12-30 RX ADMIN — ACETAMINOPHEN 1000 MG: 500 TABLET ORAL at 23:04

## 2021-12-30 RX ADMIN — LIDOCAINE HYDROCHLORIDE AND EPINEPHRINE 3 ML: 15; 5 INJECTION, SOLUTION EPIDURAL at 14:47

## 2021-12-30 RX ADMIN — KETOROLAC TROMETHAMINE 30 MG: 30 INJECTION, SOLUTION INTRAMUSCULAR at 18:07

## 2021-12-30 RX ADMIN — SODIUM CITRATE AND CITRIC ACID MONOHYDRATE 30 ML: 500; 334 SOLUTION ORAL at 15:25

## 2021-12-30 RX ADMIN — SODIUM CHLORIDE, POTASSIUM CHLORIDE, SODIUM LACTATE AND CALCIUM CHLORIDE 125 ML/HR: 600; 310; 30; 20 INJECTION, SOLUTION INTRAVENOUS at 09:36

## 2021-12-30 RX ADMIN — SODIUM CHLORIDE, POTASSIUM CHLORIDE, SODIUM LACTATE AND CALCIUM CHLORIDE 125 ML/HR: 600; 310; 30; 20 INJECTION, SOLUTION INTRAVENOUS at 16:50

## 2021-12-30 RX ADMIN — ROPIVACAINE HYDROCHLORIDE 15 ML/HR: 2 INJECTION, SOLUTION EPIDURAL; INFILTRATION at 14:54

## 2021-12-30 RX ADMIN — SODIUM CHLORIDE, POTASSIUM CHLORIDE, SODIUM LACTATE AND CALCIUM CHLORIDE 125 ML/HR: 600; 310; 30; 20 INJECTION, SOLUTION INTRAVENOUS at 00:42

## 2021-12-30 RX ADMIN — SODIUM CHLORIDE, POTASSIUM CHLORIDE, SODIUM LACTATE AND CALCIUM CHLORIDE 1000 ML: 600; 310; 30; 20 INJECTION, SOLUTION INTRAVENOUS at 15:25

## 2021-12-30 RX ADMIN — OXYTOCIN 2 MILLI-UNITS/MIN: 10 INJECTION INTRAVENOUS at 12:17

## 2021-12-30 RX ADMIN — CEFAZOLIN SODIUM 2 G: 2 INJECTION, SOLUTION INTRAVENOUS at 14:51

## 2021-12-30 RX ADMIN — PENICILLIN G 3 MILLION UNITS: 3000000 INJECTION, SOLUTION INTRAVENOUS at 07:46

## 2021-12-30 RX ADMIN — SODIUM CHLORIDE, POTASSIUM CHLORIDE, SODIUM LACTATE AND CALCIUM CHLORIDE 1000 ML: 600; 310; 30; 20 INJECTION, SOLUTION INTRAVENOUS at 14:23

## 2021-12-30 RX ADMIN — ONDANSETRON 4 MG: 2 INJECTION INTRAMUSCULAR; INTRAVENOUS at 15:40

## 2021-12-30 RX ADMIN — LIDOCAINE HYDROCHLORIDE AND EPINEPHRINE 2 ML: 15; 5 INJECTION, SOLUTION EPIDURAL at 14:50

## 2021-12-30 RX ADMIN — METOCLOPRAMIDE 10 MG: 5 INJECTION, SOLUTION INTRAMUSCULAR; INTRAVENOUS at 15:40

## 2021-12-30 RX ADMIN — PENICILLIN G 3 MILLION UNITS: 3000000 INJECTION, SOLUTION INTRAVENOUS at 04:21

## 2021-12-30 RX ADMIN — MISOPROSTOL 800 MCG: 200 TABLET ORAL at 19:08

## 2021-12-30 RX ADMIN — SODIUM CHLORIDE, POTASSIUM CHLORIDE, SODIUM LACTATE AND CALCIUM CHLORIDE 1000 ML: 600; 310; 30; 20 INJECTION, SOLUTION INTRAVENOUS at 20:26

## 2021-12-30 RX ADMIN — PRENATAL VITAMINS-IRON FUMARATE 27 MG IRON-FOLIC ACID 0.8 MG TABLET 1 TABLET: at 20:26

## 2021-12-30 RX ADMIN — SODIUM CHLORIDE, POTASSIUM CHLORIDE, SODIUM LACTATE AND CALCIUM CHLORIDE: 600; 310; 30; 20 INJECTION, SOLUTION INTRAVENOUS at 14:41

## 2021-12-30 RX ADMIN — Medication: at 19:58

## 2021-12-30 RX ADMIN — OXYTOCIN-SODIUM CHLORIDE 0.9% IV SOLN 30 UNIT/500ML 500 ML: 30-0.9/5 SOLUTION at 15:55

## 2021-12-30 RX ADMIN — ROPIVACAINE HYDROCHLORIDE 10 ML: 5 INJECTION, SOLUTION EPIDURAL; INFILTRATION; PERINEURAL at 14:53

## 2021-12-30 RX ADMIN — SODIUM CHLORIDE, POTASSIUM CHLORIDE, SODIUM LACTATE AND CALCIUM CHLORIDE 1000 ML: 600; 310; 30; 20 INJECTION, SOLUTION INTRAVENOUS at 15:04

## 2021-12-30 RX ADMIN — CEFAZOLIN SODIUM 2 G: 2 INJECTION, SOLUTION INTRAVENOUS at 20:57

## 2021-12-30 RX ADMIN — MORPHINE SULFATE 2 MG: 1 INJECTION, SOLUTION EPIDURAL; INTRATHECAL; INTRAVENOUS at 15:58

## 2021-12-30 RX ADMIN — CARBOPROST TROMETHAMINE 250 MCG: 250 INJECTION, SOLUTION INTRAMUSCULAR at 19:01

## 2021-12-30 RX ADMIN — MORPHINE SULFATE 3 MG: 1 INJECTION, SOLUTION EPIDURAL; INTRATHECAL; INTRAVENOUS at 15:56

## 2021-12-30 RX ADMIN — METHYLERGONOVINE MALEATE 200 MCG: 0.2 INJECTION, SOLUTION INTRAMUSCULAR; INTRAVENOUS at 19:01

## 2021-12-30 RX ADMIN — MORPHINE SULFATE 4 MG: 4 INJECTION, SOLUTION INTRAMUSCULAR; INTRAVENOUS at 21:10

## 2021-12-30 RX ADMIN — LIDOCAINE HYDROCHLORIDE,EPINEPHRINE BITARTRATE 10 ML: 20; .01 INJECTION, SOLUTION INFILTRATION; PERINEURAL at 15:24

## 2021-12-30 RX ADMIN — FAMOTIDINE 20 MG: 10 INJECTION, SOLUTION INTRAVENOUS at 15:40

## 2021-12-30 RX ADMIN — METHYLERGONOVINE MALEATE 200 MCG: 0.2 INJECTION, SOLUTION INTRAMUSCULAR; INTRAVENOUS at 17:31

## 2021-12-30 NOTE — ANESTHESIA POSTPROCEDURE EVALUATION
Patient: Cooper Bah    Procedure Summary     Date: 21 Room / Location: [facility] LABOR DELIVERY   [facility] LABOR DELIVERY    Anesthesia Start: 1441 Anesthesia Stop:     Procedure:  SECTION REPEAT (N/A Abdomen) Diagnosis:     Surgeons: Nico Ward MD Provider: Jerel Wagner MD    Anesthesia Type: epidural ASA Status: 2          Anesthesia Type: epidural    Vitals  Vitals Value Taken Time   BP 81/37 21 1641   Temp 98 °F (36.7 °C) 21 1524   Pulse 91 21 1646   Resp     SpO2 100 % 21 1646   Vitals shown include unvalidated device data.        Post Anesthesia Care and Evaluation    Patient location during evaluation: bedside  Patient participation: complete - patient participated  Level of consciousness: awake and alert  Pain score: 0  Pain management: adequate  Airway patency: patent  Anesthetic complications: No anesthetic complications    Cardiovascular status: acceptable  Respiratory status: acceptable  Hydration status: acceptable

## 2021-12-30 NOTE — ANESTHESIA PROCEDURE NOTES
Labor Epidural      Patient reassessed immediately prior to procedure    Patient location during procedure: floor  Performed By  CRNA: Christie Jack CRNA  Preanesthetic Checklist  Completed: patient identified, IV checked, risks and benefits discussed, surgical consent, monitors and equipment checked, pre-op evaluation and timeout performed  Prep:  Pt Position:sitting  Sterile Tech:cap, gloves, mask and sterile barrier  Prep:DuraPrep  Monitoring:blood pressure monitoring  Epidural Block Procedure:  Approach:midline  Guidance:palpation technique  Location:L4-L5  Needle Type:Tuohy  Needle Gauge:17 G  Loss of Resistance Medium: air  Loss of Resistance: 5cm  Cath Depth at skin:10 cm  Paresthesia: none  Aspiration:negative  Test Dose:negative  Number of Attempts: 1  Post Assessment:  Dressing:occlusive dressing applied and secured with tape  Pt Tolerance:patient tolerated the procedure well with no apparent complications  Complications:no

## 2021-12-30 NOTE — ANESTHESIA PREPROCEDURE EVALUATION
Anesthesia Evaluation     Patient summary reviewed and Nursing notes reviewed   history of anesthetic complications: PONV  NPO Solid Status: > 6 hours  NPO Liquid Status: < 2 hours           Airway   Mallampati: II  TM distance: >3 FB  Neck ROM: full  Dental      Pulmonary - negative pulmonary ROS   Cardiovascular - negative cardio ROS        Neuro/Psych  (+) psychiatric history Anxiety,     GI/Hepatic/Renal/Endo    (+)  GERD,      Musculoskeletal (-) negative ROS    Abdominal    Substance History - negative use     OB/GYN    (+) Pregnant,         Other - negative ROS                       Anesthesia Plan    ASA 2     epidural       Anesthetic plan, all risks, benefits, and alternatives have been provided, discussed and informed consent has been obtained with: patient.    Plan discussed with attending.

## 2021-12-31 LAB
ABO GROUP BLD: NORMAL
BASOPHILS # BLD AUTO: 0.03 10*3/MM3 (ref 0–0.2)
BASOPHILS NFR BLD AUTO: 0.2 % (ref 0–1.5)
DEPRECATED RDW RBC AUTO: 43.8 FL (ref 37–54)
EOSINOPHIL # BLD AUTO: 0.13 10*3/MM3 (ref 0–0.4)
EOSINOPHIL NFR BLD AUTO: 0.8 % (ref 0.3–6.2)
ERYTHROCYTE [DISTWIDTH] IN BLOOD BY AUTOMATED COUNT: 12.5 % (ref 12.3–15.4)
FETAL BLEED: NEGATIVE
HCT VFR BLD AUTO: 21.9 % (ref 34–46.6)
HGB BLD-MCNC: 7.1 G/DL (ref 12–15.9)
IMM GRANULOCYTES # BLD AUTO: 0.14 10*3/MM3 (ref 0–0.05)
IMM GRANULOCYTES NFR BLD AUTO: 0.8 % (ref 0–0.5)
LYMPHOCYTES # BLD AUTO: 2.5 10*3/MM3 (ref 0.7–3.1)
LYMPHOCYTES NFR BLD AUTO: 14.6 % (ref 19.6–45.3)
MCH RBC QN AUTO: 31.6 PG (ref 26.6–33)
MCHC RBC AUTO-ENTMCNC: 32.4 G/DL (ref 31.5–35.7)
MCV RBC AUTO: 97.3 FL (ref 79–97)
MONOCYTES # BLD AUTO: 1.67 10*3/MM3 (ref 0.1–0.9)
MONOCYTES NFR BLD AUTO: 9.8 % (ref 5–12)
NEUTROPHILS NFR BLD AUTO: 12.64 10*3/MM3 (ref 1.7–7)
NEUTROPHILS NFR BLD AUTO: 73.8 % (ref 42.7–76)
NRBC BLD AUTO-RTO: 0 /100 WBC (ref 0–0.2)
NUMBER OF DOSES: NORMAL
PLATELET # BLD AUTO: 257 10*3/MM3 (ref 140–450)
PMV BLD AUTO: 11.2 FL (ref 6–12)
RBC # BLD AUTO: 2.25 10*6/MM3 (ref 3.77–5.28)
RH BLD: NEGATIVE
WBC NRBC COR # BLD: 17.11 10*3/MM3 (ref 3.4–10.8)

## 2021-12-31 PROCEDURE — 85025 COMPLETE CBC W/AUTO DIFF WBC: CPT | Performed by: OBSTETRICS & GYNECOLOGY

## 2021-12-31 PROCEDURE — 25010000002 RHO D IMMUNE GLOBULIN 1500 UNIT/2ML SOLUTION PREFILLED SYRINGE: Performed by: OBSTETRICS & GYNECOLOGY

## 2021-12-31 PROCEDURE — 0503F POSTPARTUM CARE VISIT: CPT | Performed by: OBSTETRICS & GYNECOLOGY

## 2021-12-31 PROCEDURE — 86900 BLOOD TYPING SEROLOGIC ABO: CPT | Performed by: OBSTETRICS & GYNECOLOGY

## 2021-12-31 PROCEDURE — 25010000002 KETOROLAC TROMETHAMINE PER 15 MG: Performed by: OBSTETRICS & GYNECOLOGY

## 2021-12-31 PROCEDURE — 86901 BLOOD TYPING SEROLOGIC RH(D): CPT | Performed by: OBSTETRICS & GYNECOLOGY

## 2021-12-31 PROCEDURE — 85461 HEMOGLOBIN FETAL: CPT | Performed by: OBSTETRICS & GYNECOLOGY

## 2021-12-31 RX ADMIN — HUMAN RHO(D) IMMUNE GLOBULIN 1500 UNITS: 1500 SOLUTION INTRAMUSCULAR; INTRAVENOUS at 16:06

## 2021-12-31 RX ADMIN — OXYCODONE HYDROCHLORIDE 5 MG: 5 TABLET ORAL at 22:30

## 2021-12-31 RX ADMIN — KETOROLAC TROMETHAMINE 15 MG: 30 INJECTION, SOLUTION INTRAMUSCULAR; INTRAVENOUS at 19:18

## 2021-12-31 RX ADMIN — DOCUSATE SODIUM 100 MG: 100 CAPSULE, LIQUID FILLED ORAL at 20:21

## 2021-12-31 RX ADMIN — KETOROLAC TROMETHAMINE 15 MG: 30 INJECTION, SOLUTION INTRAMUSCULAR; INTRAVENOUS at 06:42

## 2021-12-31 RX ADMIN — ACETAMINOPHEN 1000 MG: 500 TABLET ORAL at 10:08

## 2021-12-31 RX ADMIN — ACETAMINOPHEN 1000 MG: 500 TABLET ORAL at 04:02

## 2021-12-31 RX ADMIN — PRENATAL VITAMINS-IRON FUMARATE 27 MG IRON-FOLIC ACID 0.8 MG TABLET 1 TABLET: at 08:19

## 2021-12-31 RX ADMIN — KETOROLAC TROMETHAMINE 15 MG: 30 INJECTION, SOLUTION INTRAMUSCULAR; INTRAVENOUS at 01:35

## 2021-12-31 RX ADMIN — ACETAMINOPHEN 1000 MG: 500 TABLET ORAL at 16:05

## 2021-12-31 RX ADMIN — KETOROLAC TROMETHAMINE 15 MG: 30 INJECTION, SOLUTION INTRAMUSCULAR; INTRAVENOUS at 13:08

## 2021-12-31 RX ADMIN — ACETAMINOPHEN 650 MG: 325 TABLET, FILM COATED ORAL at 22:28

## 2021-12-31 NOTE — ANESTHESIA POSTPROCEDURE EVALUATION
Patient: Cooper Bah    Procedure Summary     Date: 21 Room / Location: [facility] LABOR DELIVERY   [facility] LABOR DELIVERY    Anesthesia Start: 1441 Anesthesia Stop: 164    Procedure:  SECTION REPEAT (N/A Abdomen) Diagnosis:     Surgeons: Nico Ward MD Provider: Jerel Wagner MD    Anesthesia Type: epidural ASA Status: 2          Anesthesia Type: epidural    Vitals  Vitals Value Taken Time   /51 21 1129   Temp 98.6 °F (37 °C) 21 1129   Pulse 90 21 1129   Resp 18 21 1129   SpO2 97 % 21 2352   Vitals shown include unvalidated device data.        Post Anesthesia Care and Evaluation    Patient location during evaluation: bedside  Patient participation: complete - patient participated  Level of consciousness: awake  Pain score: 2  Pain management: satisfactory to patient  Airway patency: patent  Anesthetic complications: No anesthetic complications  PONV Status: none  Cardiovascular status: acceptable and hemodynamically stable  Respiratory status: acceptable  Hydration status: acceptable  Post Neuraxial Block status: Motor and sensory function returned to baseline and No signs or symptoms of PDPH

## 2022-01-01 VITALS
SYSTOLIC BLOOD PRESSURE: 119 MMHG | HEART RATE: 85 BPM | WEIGHT: 201 LBS | OXYGEN SATURATION: 97 % | RESPIRATION RATE: 18 BRPM | HEIGHT: 67 IN | BODY MASS INDEX: 31.55 KG/M2 | TEMPERATURE: 99.1 F | DIASTOLIC BLOOD PRESSURE: 75 MMHG

## 2022-01-01 PROBLEM — O45.93 PLACENTAL ABRUPTION, THIRD TRIMESTER: Status: RESOLVED | Noted: 2021-12-30 | Resolved: 2022-01-01

## 2022-01-01 PROBLEM — O34.219 HISTORY OF CESAREAN SECTION COMPLICATING PREGNANCY: Status: RESOLVED | Noted: 2021-05-19 | Resolved: 2022-01-01

## 2022-01-01 PROBLEM — Z67.91 RH NEGATIVE, ANTEPARTUM: Status: RESOLVED | Noted: 2021-05-19 | Resolved: 2022-01-01

## 2022-01-01 PROBLEM — Z98.891 PREVIOUS CESAREAN SECTION: Status: RESOLVED | Noted: 2021-12-29 | Resolved: 2022-01-01

## 2022-01-01 PROBLEM — O99.820 GBS (GROUP B STREPTOCOCCUS CARRIER), +RV CULTURE, CURRENTLY PREGNANT: Status: RESOLVED | Noted: 2021-12-15 | Resolved: 2022-01-01

## 2022-01-01 PROBLEM — O26.899 RH NEGATIVE, ANTEPARTUM: Status: RESOLVED | Noted: 2021-05-19 | Resolved: 2022-01-01

## 2022-01-01 PROBLEM — Z34.90 PREGNANCY: Status: RESOLVED | Noted: 2021-05-19 | Resolved: 2022-01-01

## 2022-01-01 PROBLEM — D62 ANEMIA DUE TO ACUTE BLOOD LOSS: Status: ACTIVE | Noted: 2022-01-01

## 2022-01-01 LAB
BASOPHILS # BLD AUTO: 0.05 10*3/MM3 (ref 0–0.2)
BASOPHILS NFR BLD AUTO: 0.3 % (ref 0–1.5)
DEPRECATED RDW RBC AUTO: 44.3 FL (ref 37–54)
EOSINOPHIL # BLD AUTO: 0.28 10*3/MM3 (ref 0–0.4)
EOSINOPHIL NFR BLD AUTO: 1.7 % (ref 0.3–6.2)
ERYTHROCYTE [DISTWIDTH] IN BLOOD BY AUTOMATED COUNT: 12.9 % (ref 12.3–15.4)
HCT VFR BLD AUTO: 20.6 % (ref 34–46.6)
HGB BLD-MCNC: 6.7 G/DL (ref 12–15.9)
IMM GRANULOCYTES # BLD AUTO: 0.13 10*3/MM3 (ref 0–0.05)
IMM GRANULOCYTES NFR BLD AUTO: 0.8 % (ref 0–0.5)
LYMPHOCYTES # BLD AUTO: 2.71 10*3/MM3 (ref 0.7–3.1)
LYMPHOCYTES NFR BLD AUTO: 16.5 % (ref 19.6–45.3)
MCH RBC QN AUTO: 31.3 PG (ref 26.6–33)
MCHC RBC AUTO-ENTMCNC: 32.5 G/DL (ref 31.5–35.7)
MCV RBC AUTO: 96.3 FL (ref 79–97)
MONOCYTES # BLD AUTO: 1.39 10*3/MM3 (ref 0.1–0.9)
MONOCYTES NFR BLD AUTO: 8.4 % (ref 5–12)
NEUTROPHILS NFR BLD AUTO: 11.9 10*3/MM3 (ref 1.7–7)
NEUTROPHILS NFR BLD AUTO: 72.3 % (ref 42.7–76)
NRBC BLD AUTO-RTO: 0 /100 WBC (ref 0–0.2)
PLATELET # BLD AUTO: 286 10*3/MM3 (ref 140–450)
PMV BLD AUTO: 10.9 FL (ref 6–12)
RBC # BLD AUTO: 2.14 10*6/MM3 (ref 3.77–5.28)
WBC NRBC COR # BLD: 16.46 10*3/MM3 (ref 3.4–10.8)

## 2022-01-01 PROCEDURE — P9016 RBC LEUKOCYTES REDUCED: HCPCS

## 2022-01-01 PROCEDURE — 36430 TRANSFUSION BLD/BLD COMPNT: CPT

## 2022-01-01 PROCEDURE — 0503F POSTPARTUM CARE VISIT: CPT | Performed by: OBSTETRICS & GYNECOLOGY

## 2022-01-01 PROCEDURE — 85025 COMPLETE CBC W/AUTO DIFF WBC: CPT | Performed by: OBSTETRICS & GYNECOLOGY

## 2022-01-01 PROCEDURE — 86900 BLOOD TYPING SEROLOGIC ABO: CPT

## 2022-01-01 RX ORDER — OXYCODONE HYDROCHLORIDE 5 MG/1
5 TABLET ORAL EVERY 4 HOURS PRN
Status: DISCONTINUED | OUTPATIENT
Start: 2022-01-01 | End: 2022-01-01 | Stop reason: HOSPADM

## 2022-01-01 RX ORDER — IBUPROFEN 600 MG/1
600 TABLET ORAL EVERY 6 HOURS
Qty: 30 TABLET | Refills: 1 | Status: SHIPPED | OUTPATIENT
Start: 2022-01-01

## 2022-01-01 RX ORDER — DIPHENHYDRAMINE HCL 25 MG
25 CAPSULE ORAL ONCE
Status: DISCONTINUED | OUTPATIENT
Start: 2022-01-01 | End: 2022-01-01 | Stop reason: HOSPADM

## 2022-01-01 RX ORDER — ACETAMINOPHEN 325 MG/1
650 TABLET ORAL ONCE
Status: DISCONTINUED | OUTPATIENT
Start: 2022-01-01 | End: 2022-01-01 | Stop reason: HOSPADM

## 2022-01-01 RX ORDER — OXYCODONE HYDROCHLORIDE 5 MG/1
5 TABLET ORAL EVERY 6 HOURS PRN
Qty: 20 TABLET | Refills: 0 | Status: SHIPPED | OUTPATIENT
Start: 2022-01-01 | End: 2022-01-03 | Stop reason: SDUPTHER

## 2022-01-01 RX ORDER — ACETAMINOPHEN 160 MG/5ML
650 SOLUTION ORAL ONCE
Status: DISCONTINUED | OUTPATIENT
Start: 2022-01-01 | End: 2022-01-01 | Stop reason: HOSPADM

## 2022-01-01 RX ORDER — DIPHENHYDRAMINE HYDROCHLORIDE 50 MG/ML
25 INJECTION INTRAMUSCULAR; INTRAVENOUS ONCE
Status: DISCONTINUED | OUTPATIENT
Start: 2022-01-01 | End: 2022-01-01 | Stop reason: HOSPADM

## 2022-01-01 RX ORDER — ACETAMINOPHEN 650 MG/1
650 SUPPOSITORY RECTAL ONCE
Status: DISCONTINUED | OUTPATIENT
Start: 2022-01-01 | End: 2022-01-01 | Stop reason: HOSPADM

## 2022-01-01 RX ADMIN — IBUPROFEN 600 MG: 600 TABLET ORAL at 07:42

## 2022-01-01 RX ADMIN — IBUPROFEN 600 MG: 600 TABLET ORAL at 13:32

## 2022-01-01 RX ADMIN — OXYCODONE HYDROCHLORIDE 5 MG: 5 TABLET ORAL at 15:39

## 2022-01-01 RX ADMIN — IBUPROFEN 600 MG: 600 TABLET ORAL at 00:40

## 2022-01-01 RX ADMIN — ACETAMINOPHEN 650 MG: 325 TABLET, FILM COATED ORAL at 15:39

## 2022-01-01 RX ADMIN — ACETAMINOPHEN 650 MG: 325 TABLET, FILM COATED ORAL at 03:05

## 2022-01-01 RX ADMIN — OXYCODONE HYDROCHLORIDE 5 MG: 5 TABLET ORAL at 19:34

## 2022-01-01 RX ADMIN — PRENATAL VITAMINS-IRON FUMARATE 27 MG IRON-FOLIC ACID 0.8 MG TABLET 1 TABLET: at 07:42

## 2022-01-01 RX ADMIN — ACETAMINOPHEN 650 MG: 325 TABLET, FILM COATED ORAL at 10:59

## 2022-01-01 RX ADMIN — DOCUSATE SODIUM 100 MG: 100 CAPSULE, LIQUID FILLED ORAL at 07:42

## 2022-01-01 RX ADMIN — IBUPROFEN 600 MG: 600 TABLET ORAL at 19:00

## 2022-01-01 NOTE — DISCHARGE INSTRUCTIONS
Delivery, Care After  This sheet gives you information about how to care for yourself after your procedure. Your health care provider may also give you more specific instructions. If you have problems or questions, contact your health care provider.  What can I expect after the procedure?  After the procedure, it is common to have:  · A small amount of blood or clear fluid coming from the incision.  · Some redness, swelling, and pain in your incision area.  · Some abdominal pain and soreness.  · Vaginal bleeding (lochia). Even though you did not have a vaginal delivery, you will still have vaginal bleeding and discharge.  · Pelvic cramps.  · Fatigue.  You may have pain, swelling, and discomfort in the tissue between your vagina and your anus (perineum) if:  · Your  was unplanned, and you were allowed to labor and push.  · An incision was made in the area (episiotomy) or the tissue tore during attempted vaginal delivery.  Follow these instructions at home:  Incision care    · Follow instructions from your health care provider about how to take care of your incision. Make sure you:  ? Wash your hands with soap and water before you change your bandage (dressing). If soap and water are not available, use hand .  ? If you have a dressing, change it or remove it as told by your health care provider.  ? Leave stitches (sutures), skin staples, skin glue, or adhesive strips in place. These skin closures may need to stay in place for 2 weeks or longer. If adhesive strip edges start to loosen and curl up, you may trim the loose edges. Do not remove adhesive strips completely unless your health care provider tells you to do that.  · Check your incision area every day for signs of infection. Check for:  ? More redness, swelling, or pain.  ? More fluid or blood.  ? Warmth.  ? Pus or a bad smell.  · Do not take baths, swim, or use a hot tub until your health care provider says it's okay. Ask your health  care provider if you can take showers.  · When you cough or sneeze, hug a pillow. This helps with pain and decreases the chance of your incision opening up (dehiscing). Do this until your incision heals.    Medicines  · Take over-the-counter and prescription medicines only as told by your health care provider.  · If you were prescribed an antibiotic medicine, take it as told by your health care provider. Do not stop taking the antibiotic even if you start to feel better.  · Do not drive or use heavy machinery while taking prescription pain medicine.  Lifestyle  · Do not drink alcohol. This is especially important if you are breastfeeding or taking pain medicine.  · Do not use any products that contain nicotine or tobacco, such as cigarettes, e-cigarettes, and chewing tobacco. If you need help quitting, ask your health care provider.  Eating and drinking  · Drink at least 8 eight-ounce glasses of water every day unless told not to by your health care provider. If you breastfeed, you may need to drink even more water.  · Eat high-fiber foods every day. These foods may help prevent or relieve constipation. High-fiber foods include:  ? Whole grain cereals and breads.  ? Brown rice.  ? Beans.  ? Fresh fruits and vegetables.  Activity    · If possible, have someone help you care for your baby and help with household activities for at least a few days after you leave the hospital.  · Return to your normal activities as told by your health care provider. Ask your health care provider what activities are safe for you.  · Rest as much as possible. Try to rest or take a nap while your baby is sleeping.  · Do not lift anything that is heavier than 10 lbs (4.5 kg), or the limit that you were told, until your health care provider says that it is safe.  · Talk with your health care provider about when you can engage in sexual activity. This may depend on your:  ? Risk of infection.  ? How fast you heal.  ? Comfort and desire to  engage in sexual activity.    General instructions  · Do not use tampons or douches until your health care provider approves.  · Wear loose, comfortable clothing and a supportive and well-fitting bra.  · Keep your perineum clean and dry. Wipe from front to back when you use the toilet.  · If you pass a blood clot, save it and call your health care provider to discuss. Do not flush blood clots down the toilet before you get instructions from your health care provider.  · Keep all follow-up visits for you and your baby as told by your health care provider. This is important.  Contact a health care provider if:  · You have:  ? A fever.  ? Bad-smelling vaginal discharge.  ? Pus or a bad smell coming from your incision.  ? Difficulty or pain when urinating.  ? A sudden increase or decrease in the frequency of your bowel movements.  ? More redness, swelling, or pain around your incision.  ? More fluid or blood coming from your incision.  ? A rash.  ? Nausea.  ? Little or no interest in activities you used to enjoy.  ? Questions about caring for yourself or your baby.  · Your incision feels warm to the touch.  · Your breasts turn red or become painful or hard.  · You feel unusually sad or worried.  · You vomit.  · You pass a blood clot from your vagina.  · You urinate more than usual.  · You are dizzy or light-headed.  Get help right away if:  · You have:  ? Pain that does not go away or get better with medicine.  ? Chest pain.  ? Difficulty breathing.  ? Blurred vision or spots in your vision.  ? Thoughts about hurting yourself or your baby.  ? New pain in your abdomen or in one of your legs.  ? A severe headache.  · You faint.  · You bleed from your vagina so much that you fill more than one sanitary pad in one hour. Bleeding should not be heavier than your heaviest period.  Summary  · After the procedure, it is common to have pain at your incision site, abdominal cramping, and slight bleeding from your vagina.  · Check  your incision area every day for signs of infection.  · Tell your health care provider about any unusual symptoms.  · Keep all follow-up visits for you and your baby as told by your health care provider.  This information is not intended to replace advice given to you by your health care provider. Make sure you discuss any questions you have with your health care provider.  Document Revised: 06/26/2019 Document Reviewed: 06/26/2019  [employer] Patient Education © 2021 [employer]

## 2022-01-01 NOTE — DISCHARGE SUMMARY
Discharge Summary    Date of Admission: 2021  Date of Discharge:  2022      Patient: Cooper Bah      MR#:[id number]    Primary Surgeon/OB: Nico Ward MD    Discharge Surgeon/OB: Suman Dorman MD    Presenting Problem/History of Present Illness  Previous  section [Z98.891]  39 weeks gestation of pregnancy [Z3A.39]  Pregnancy [Z34.90]     Patient Active Problem List    Diagnosis    • *Delivery by  section using transverse incision of lower segment of uterus [O82]    • Anemia due to acute blood loss [D62]    • Low-lying placenta [O44.40]    • Morbidly adherent placenta, antepartum [O73.0]    • Subchorionic hematoma in first trimester [O41.8X10, O46.8X1]    • BV (bacterial vaginosis) [N76.0, B96.89]    • Abdominal tenderness of left lower quadrant [R10.814]    • Endometriosis [N80.9]    • Pelvic pain [R10.2]    • Threatened  [O20.0]    • Focal nodular hyperplasia of liver [K76.89]    • Liver mass [R16.0]    • Hematemesis with nausea [K92.0]    • Weight loss [R63.4]    • Abdominal pain [R10.9]          Discharge Diagnosis:  section at 38w3d    Procedures:  , Low Transverse     2021    3:54 PM            Discharge Date: 2022; Discharge Time: 12:49 EST        Hospital Course  Patient is a 31 y.o. female  at 38w3d status post  section with postoperative recovery complicated by postpartum hemorrhage. She was found to be anemic and transfused with 2U RBC prior to DC.  Patient was advanced to regular diet on postoperative day#1.  On discharge, ambulating, tolerating a regular diet without any difficulties and her incision is dry, clean and intact.     Infant:   male  fetus 3598 g (7 lb 14.9 oz)  with Apgar scores of 8 , 9  at five minutes.    Condition on Discharge:  Stable    Vital Signs  Temp:  [98.1 °F (36.7 °C)-99 °F (37.2 °C)] 98.6 °F (37 °C)  Heart Rate:  [] 88  Resp:  [16-20] 18  BP: (107-124)/(59-74) 118/59    Lab  Results   Component Value Date    WBC 16.46 (H) 01/01/2022    HGB 6.7 (C) 01/01/2022    HCT 20.6 (C) 01/01/2022    MCV 96.3 01/01/2022     01/01/2022       Discharge Disposition  Home or Self Care    Discharge Medications     Discharge Medications      New Medications      Instructions Start Date   ibuprofen 600 MG tablet  Commonly known as: ADVIL,MOTRIN   600 mg, Oral, Every 6 Hours      oxyCODONE 5 MG immediate release tablet  Commonly known as: ROXICODONE   5 mg, Oral, Every 6 Hours PRN         Continue These Medications      Instructions Start Date   prenatal (CLASSIC) vitamin  tablet  Generic drug: prenatal vitamin   Oral, Daily      Ventolin  (90 Base) MCG/ACT inhaler  Generic drug: albuterol sulfate HFA   No dose, route, or frequency recorded.                 Follow-up Appointments  Future Appointments   Date Time Provider Department Center   1/2/2022  1:45 PM [facility]  [facility]         Suman Dorman MD  01/01/22  12:49 EST  [facility]

## 2022-01-01 NOTE — PROGRESS NOTES
Postpartum Progress Note    Patient name: Cooper Bah  YOB: 1990   MRN: [id number]  Referring Provider: Nico Ward MD  Admission Date: 2021  Date of Service: 2022    ID: 31 y.o.     Diagnosis:   S/p  delivery 2 Days Post-Op   Patient Active Problem List   Diagnosis   • Hematemesis with nausea   • Weight loss   • Abdominal pain   • Liver mass   • Focal nodular hyperplasia of liver   • Pregnancy   • Rh negative, antepartum   • Endometriosis   • History of  section complicating pregnancy   • Pelvic pain   • Threatened    • BV (bacterial vaginosis)   • Abdominal tenderness of left lower quadrant   • Subchorionic hematoma in first trimester   • Morbidly adherent placenta, antepartum   • Low-lying placenta   • GBS (group B Streptococcus carrier), +RV culture, currently pregnant   • Previous  section   • Placental abruption, third trimester   • Delivery by  section using transverse incision of lower segment of uterus       Subjective:      She is feeling fatigued this morning and we discussed low blood counts. I recommend blood transfusion and she agrees.  Moderate lochia.  Ambulating, voiding, tolerating diet.  Pain well controlled.  The patient is currently breastfeeding.   This baby is a boy and circumcision performed.    Objective:      Vital signs:  Vital Signs Range for the last 24 hours  Temperature: Temp:  [98.1 °F (36.7 °C)-99 °F (37.2 °C)] 98.4 °F (36.9 °C)   Temp Source: Temp src: Axillary   BP: BP: (104-124)/(51-74) 120/74   Pulse: Heart Rate:  [] 87   Respirations: Resp:  [16-20] 18   Weight: 91.2 kg (201 lb)     General: Alert & oriented x4, in no apparent distress  Abdomen: soft, nontender  Uterus: firm, nontender  Incision: clean, dry, intact, dressing clean, sutures  Extremities: nontender; no edema      Labs:  Lab Results   Component Value Date    WBC 16.46 (H) 2022    HGB 6.7 (C) 2022    HCT 20.6 (C)  01/01/2022    MCV 96.3 01/01/2022     01/01/2022     Results from last 7 days   Lab Units 12/31/21  1041   ABO TYPING  A   RH TYPING  Negative     External Prenatal Results     Pregnancy Outside Results - Transcribed From Office Records - See Scanned Records For Details     Test Value Date Time    ABO  A  12/31/21 1041    Rh  Negative  12/31/21 1041    Antibody Screen  Positive  12/29/21 2309       Negative  10/22/21 1134       Positive  07/08/21 1853       Positive  05/19/21 1057       See Final Results  05/19/21 1057       Negative  05/12/21 2230    Varicella IgG       Rubella  3.16 index 05/19/21 1057    Hgb  6.7 g/dL 01/01/22 0849       7.1 g/dL 12/31/21 1041       8.0 g/dL 12/30/21 2050       11.6 g/dL 12/29/21 2309       12.0 g/dL 10/22/21 1134       12.9 g/dL 07/08/21 1835       14.5 g/dL 05/19/21 1057       14.7 g/dL 05/12/21 2227    Hct  20.6 % 01/01/22 0849       21.9 % 12/31/21 1041       24.2 % 12/30/21 2050       33.6 % 12/29/21 2309       35.9 % 10/22/21 1134       37.3 % 07/08/21 1835       42.8 % 05/19/21 1057       44.1 % 05/12/21 2227    Glucose Fasting GTT       Glucose Tolerance Test 1 hour       Glucose Tolerance Test 3 hour       Gonorrhea (discrete)  Negative  05/19/21 1030    Chlamydia (discrete)  Negative  05/19/21 1030    RPR  Non Reactive  05/19/21 1057    VDRL       Syphilis Antibody       HBsAg  Negative  05/19/21 1057    Herpes Simplex Virus PCR       Herpes Simplex VIrus Culture       HIV  Non Reactive  05/19/21 1057    Hep C RNA Quant PCR       Hep C Antibody  <0.1 s/co ratio 05/19/21 1057    AFP  52.5 ng/mL 07/29/21 1606    Group B Strep  Streptococcus agalactiae (Group B)  12/13/21 1714    GBS Susceptibility to Clindamycin       GBS Susceptibility to Erythromycin       Fetal Fibronectin       Genetic Testing, Maternal Blood             Drug Screening     Test Value Date Time    Urine Drug Screen       Amphetamine Screen  Negative ng/mL 05/19/21 1057    Barbiturate Screen   Negative ng/mL 21 1057    Benzodiazepine Screen  Negative ng/mL 21 1057    Methadone Screen  Negative ng/mL 21 1057    Phencyclidine Screen  Negative ng/mL 21 1057    Opiates Screen       THC Screen       Cocaine Screen       Propoxyphene Screen  Negative ng/mL 21 1057    Buprenorphine Screen       Methamphetamine Screen       Oxycodone Screen       Tricyclic Antidepressants Screen             Legend    ^: Historical                        Assessment/Plan:      2 Days Post-Op s/p Procedure(s):   SECTION REPEAT  1. S/p  delivery: Continue postoperative care.  Doing well.  2. Infant feeding: Supportive care.  The patient is currently breastfeeding.  3. Contraception: Education given regarding options for contraception, including barrier methods, injectable contraception, IUD placement, oral contraceptives.  4. Blood loss anemia: Will transfuse 2 units RBC today.   If feels better this afternoon, will DC home later today.    Suman Dorman MD  22

## 2022-01-01 NOTE — PLAN OF CARE
Problem: Breastfeeding  Goal: Effective Breastfeeding  Outcome: Met  Intervention: Promote Effective Breastfeeding  Recent Flowsheet Documentation  Taken 2022 0742 by Raina Duong RN  Parent/Child Attachment Promotion:   positive reinforcement provided   rooming-in promoted   skin-to-skin contact encouraged   strengths emphasized  Intervention: Support Exclusive Breastfeeding Success  Recent Flowsheet Documentation  Taken 2022 0742 by Raina Duong RN  Supportive Measures: active listening utilized     Problem: Adjustment to Role Transition (Postpartum  Delivery)  Goal: Successful Maternal Role Transition  Outcome: Met  Intervention: Support Maternal Role Transition  Recent Flowsheet Documentation  Taken 2022 0742 by Raina Duong RN  Supportive Measures: active listening utilized  Parent/Child Attachment Promotion:   positive reinforcement provided   rooming-in promoted   skin-to-skin contact encouraged   strengths emphasized     Problem: Bleeding (Postpartum  Delivery)  Goal: Hemostasis  Outcome: Met     Problem: Infection (Postpartum  Delivery)  Goal: Absence of Infection Signs and Symptoms  Outcome: Met     Problem: Pain (Postpartum  Delivery)  Goal: Acceptable Pain Control  Outcome: Met  Intervention: Prevent or Manage Pain  Recent Flowsheet Documentation  Taken 2022 1539 by Raina Duong RN  Pain Management Interventions:   around-the-clock dosing utilized   see MAR  Taken 2022 1059 by Raina Duong RN  Pain Management Interventions:   around-the-clock dosing utilized   see MAR  Taken 2022 0742 by Raina Duong RN  Pain Management Interventions: see MAR     Problem: Postoperative Nausea and Vomiting (Postpartum  Delivery)  Goal: Nausea and Vomiting Relief  Outcome: Met     Problem: Postoperative Urinary Retention (Postpartum  Delivery)  Goal: Effective Urinary Elimination  Outcome: Met   Goal Outcome Evaluation:

## 2022-01-02 ENCOUNTER — APPOINTMENT (OUTPATIENT)
Dept: PREADMISSION TESTING | Facility: HOSPITAL | Age: 32
End: 2022-01-02

## 2022-01-02 DIAGNOSIS — Z01.818 PRE-OP TESTING: Primary | ICD-10-CM

## 2022-01-02 LAB
BH BB BLOOD EXPIRATION DATE: NORMAL
BH BB BLOOD EXPIRATION DATE: NORMAL
BH BB BLOOD TYPE BARCODE: 600
BH BB BLOOD TYPE BARCODE: 600
BH BB DISPENSE STATUS: NORMAL
BH BB DISPENSE STATUS: NORMAL
BH BB PRODUCT CODE: NORMAL
BH BB PRODUCT CODE: NORMAL
BH BB UNIT NUMBER: NORMAL
BH BB UNIT NUMBER: NORMAL
CROSSMATCH INTERPRETATION: NORMAL
CROSSMATCH INTERPRETATION: NORMAL
UNIT  ABO: NORMAL
UNIT  ABO: NORMAL
UNIT  RH: NORMAL
UNIT  RH: NORMAL

## 2022-01-03 DIAGNOSIS — D62 ANEMIA DUE TO ACUTE BLOOD LOSS: ICD-10-CM

## 2022-01-03 RX ORDER — OXYCODONE HYDROCHLORIDE 5 MG/1
5 TABLET ORAL EVERY 6 HOURS PRN
Qty: 20 TABLET | Refills: 0 | Status: SHIPPED | OUTPATIENT
Start: 2022-01-03 | End: 2022-01-08

## 2022-01-03 NOTE — TELEPHONE ENCOUNTER
Her post op pain med was called to [facility] [address] and it was closed due to New Years hours. She was able to get the Ibuprofen but not the [place]. She lives in [facility] and cannot drive and would like it sent to [facility] on [address]  in [facility]. I did call the [facility] in [place] and cancelled the original rx.

## 2022-01-03 NOTE — PAYOR COMM NOTE
"Deedee Silverman (31 y.o. Female)     Auth#[id number]    Discharged 22 with Baby.    From: Peggy Hall LPN, Utilization Review  Phone #[phone]  Fax #[phone]              Date of Birth Social Security Number Address Home Phone MRN    1990  [id number]  [facility] [facility] [place] [facility] [address] [phone] [id number]    Hinduism Marital Status             Sikh        Admission Date Admission Type Admitting Provider Attending Provider Department, Room/Bed    21 Elective Nico Ward MD  [facility] MOTHER BABY 4B, N433/1    Discharge Date Discharge Disposition Discharge Destination          2022 Home or Self Care              Attending Provider: (none)   Allergies: No Known Allergies    Isolation: None   Infection: None   Code Status: Prior   Advance Care Planning Activity    Ht: 170.2 cm (67\")   Wt: 91.2 kg (201 lb)    Admission Cmt: None   Principal Problem: Delivery by  section using transverse incision of lower segment of uterus [O82] More...                 Active Insurance as of 2021     Primary Coverage     Payor Plan Insurance Group Employer/Plan Group    MISC COMMERCIAL MISC COMMERCIAL [id number]     Coverage Address Coverage Phone Number Coverage Fax Number Effective Dates    [address] [phone] 2021 - None Entered    [facility] [id number]       Subscriber Name Subscriber Birth Date Member ID       ARMANDO COLLINS 1990 [id number]           Secondary Coverage     Payor Plan Insurance Group Employer/Plan Group    [employer] [place] [employer] [place]ICAID      Payor Plan Address Payor Plan Phone Number Payor Plan Fax Number Effective Dates    [address] [address] [phone]  2020 - None Entered    [facility] [id number]       Subscriber Name Subscriber Birth Date Member ID       DEEDEE SILVERMAN 1990 [id number]                 Emergency Contacts      (Rel.) Home Phone Work Phone Mobile Phone    HUBER SILVERMAN (Mother) " [phone] -- [phone]    KRISTINA COLLINS (Significant Other) [phone] -- [phone]               Operative/Procedure Notes (last 7 days)      Nico Ward MD at 21 1546           Section Procedure Note    Indications: H/o previous  section low transverse; probable placental abruption      Pre-operative Diagnosis: 1: 38w3d pregnancy. 2: History of prior  section. 3: Request vaginal delivery after  section. 4: Probable placental abruption with bloody amniotic fluid. 5: History of low-lying anterior placenta.                Patient Active Problem List   Diagnosis   • Hematemesis with nausea   • Weight loss   • Abdominal pain   • Liver mass   • Focal nodular hyperplasia of liver   • Pregnancy   • Rh negative, antepartum   • Endometriosis   • History of  section complicating pregnancy   • Pelvic pain   • Threatened    • BV (bacterial vaginosis)   • Abdominal tenderness of left lower quadrant   • Subchorionic hematoma in first trimester   • Morbidly adherent placenta, antepartum   • Low-lying placenta   • GBS (group B Streptococcus carrier), +RV culture, currently pregnant   • Previous  section   • Placental abruption, third trimester                Post-operative Diagnosis: 1:  Same.  2: Delivery of viable male infant. 3: Mildly adherent placenta. 4: Borderline narrow pelvic inlet.    Procedures:  Procedure(s):   SECTION REPEAT; low transverse.    Surgeons:  Surgeon(s) and Role:     * Nico Ward MD - Primary    Anesthesia:  Epidural    Estimated Blood Loss: 1000 ml    Infant:            Gender: male  infant    Weight: 3598 g (7 lb 14.9 oz)     Apgars: 8  @ 1 minute /     9  @ 5 minutes    Cord gases: Venous:    pH, Cord Venous   Date Value Ref Range Status   2021 7.346 7.310 - 7.370 pH Units Final      Arterial:    pH, Cord Arterial   Date Value Ref Range Status   2021 7.25 7.22 - 7.30 pH Units Final                   Findings:       The infant was delivered from the Presentation/Position:  ;      Vertex;  The amnionic fluid was Bloody;Clear;Other (See Comments)   Uterus tubes and ovaries appeared normal. Placenta was mildly adherent to the lower uterine segment. Pelvic inlet was noted to be borderline narrow.    Drains:  Álvarez catheter to straight drainage.                 Specimens: Placenta.    Antibiotics:  cefazolin (Ancef)           Complications:  None; patient tolerated the procedure well.           Disposition: PACU - hemodynamically stable.           Condition: stable    Procedure Details   The patient was seen in the labor room. The risks, benefits, complications, treatment options, and expected outcomes were discussed with the patient.  The patient concurred with the proposed plan, giving informed consent.  The patient was taken to the Operating Room, identified as Cooper Bah and the procedure verified as  Delivery. A Time Out was held and the above information confirmed.    After an adequate level of anesthesia was obtained, the patient was draped and prepped in the usual sterile manner. A Pfannenstiel incision was made along her prior scar and carried down through the subcutaneous tissue to the fascia. Fascial incision was made and extended transversely with the Zamorano scissors. The fascia was  bluntly and sharply from the underlying rectus tissue superiorly and inferiorly. The rectus muscles were divided sharply along the midline. The peritoneum was identified and entered. Peritoneal incision was extended longitudinally taking care not to injure the bladder and bowel.  The bladder flap was sharply and bluntly developed  A low transverse uterine incision was made with the scalpel and the uterine cavity was entered sharply and bloody amniotic fluid was adequate. The uterine incision was extended bluntly in a transverse fashion. The. The placenta was noted at the uterine incision. The infant  was delivered from vertex   presentation without difficulty.  The nose and oropharynx were bulb suctioned.  After the umbilical cord was milked four times, it was clamped and cut, and cord blood was obtained for evaluation. The placenta was removed manually starting at the fundal and and dissecting towards the uterine incision. The placenta was mildly apparent near the incision but was removed intact.. The uterus was exteriorized and wrapped in a moist laparotomy sponge. The endometrial cavity was explored and wiped clean with a moist laparotomy sponge in all membranes were removed..  The uterine outline, tubes and ovaries appeared normal. There were small extensions of the uterine incision inferiorly on each side. The angles of the incision were grasped with Allis clamps. The uterine incision was closed with a running continuous locking suture of #1 chromic starting from each angle and working towards the center.. Hemostasis was noted to be good.. The uterus was returned to the peritoneal cavity.  Irrigation was performed and hemostasis confirmed. All sponge and instrument counts were correct. The peritoneum was closed with a running continuous suture of 2-0 Vicryl;  The rectus muscles reapproximated with interrupted sutures of 0 Vicryl. The fascia was then reapproximated with running continuous sutures of 0 Vicryl. The subcutaneous layer was irrigated, noted to be hemostatic, and closed with 3-0 plain Gut.  The skin was reapproximated with Insorb subcuticular staples and Skin Glue was placed.    Instrument, sponge, and needle counts were correct prior the abdominal closure and at the conclusion of the case. A large clot was expressed from the lower uterine segment at the end of the case. The patient went to the Recovery Room in stable condition with the french draining clear urine.       Nico Ward MD      12/30/2021  16:37 EST                  Electronically signed by Nico Ward MD at 12/30/21  1651          Discharge Summary      Suman Dorman MD at 22 1249          Discharge Summary    Date of Admission: 2021  Date of Discharge:  2022      Patient: Cooper Bah      MR#:[id number]    Primary Surgeon/OB: Nico Ward MD    Discharge Surgeon/OB: Suman Dorman MD    Presenting Problem/History of Present Illness  Previous  section [Z98.891]  39 weeks gestation of pregnancy [Z3A.39]  Pregnancy [Z34.90]     Patient Active Problem List    Diagnosis    • *Delivery by  section using transverse incision of lower segment of uterus [O82]    • Anemia due to acute blood loss [D62]    • Low-lying placenta [O44.40]    • Morbidly adherent placenta, antepartum [O73.0]    • Subchorionic hematoma in first trimester [O41.8X10, O46.8X1]    • BV (bacterial vaginosis) [N76.0, B96.89]    • Abdominal tenderness of left lower quadrant [R10.814]    • Endometriosis [N80.9]    • Pelvic pain [R10.2]    • Threatened  [O20.0]    • Focal nodular hyperplasia of liver [K76.89]    • Liver mass [R16.0]    • Hematemesis with nausea [K92.0]    • Weight loss [R63.4]    • Abdominal pain [R10.9]          Discharge Diagnosis:  section at 38w3d    Procedures:  , Low Transverse     2021    3:54 PM            Discharge Date: 2022; Discharge Time: 12:49 EST        Hospital Course  Patient is a 31 y.o. female  at 38w3d status post  section with postoperative recovery complicated by postpartum hemorrhage. She was found to be anemic and transfused with 2U RBC prior to DC.  Patient was advanced to regular diet on postoperative day#1.  On discharge, ambulating, tolerating a regular diet without any difficulties and her incision is dry, clean and intact.     Infant:   male  fetus 3598 g (7 lb 14.9 oz)  with Apgar scores of 8 , 9  at five minutes.    Condition on Discharge:  Stable    Vital Signs  Temp:  [98.1 °F (36.7 °C)-99 °F (37.2 °C)] 98.6 °F (37  °C)  Heart Rate:  [] 88  Resp:  [16-20] 18  BP: (107-124)/(59-74) 118/59    Lab Results   Component Value Date    WBC 16.46 (H) 01/01/2022    HGB 6.7 (C) 01/01/2022    HCT 20.6 (C) 01/01/2022    MCV 96.3 01/01/2022     01/01/2022       Discharge Disposition  Home or Self Care    Discharge Medications     Discharge Medications      New Medications      Instructions Start Date   ibuprofen 600 MG tablet  Commonly known as: ADVIL,MOTRIN   600 mg, Oral, Every 6 Hours      oxyCODONE 5 MG immediate release tablet  Commonly known as: ROXICODONE   5 mg, Oral, Every 6 Hours PRN         Continue These Medications      Instructions Start Date   prenatal (CLASSIC) vitamin  tablet  Generic drug: prenatal vitamin   Oral, Daily      Ventolin  (90 Base) MCG/ACT inhaler  Generic drug: albuterol sulfate HFA   No dose, route, or frequency recorded.                 Follow-up Appointments  Future Appointments   Date Time Provider Department Center   1/2/2022  1:45 PM [facility]  [facility]         Suman Dorman MD  01/01/22  12:49 EST  [facility]        Electronically signed by Suman Dorman MD at 01/01/22 6694

## 2022-01-03 NOTE — TELEPHONE ENCOUNTER
Patient called and hasn't been able to get her pain medication from her  because of pharmacy. She said she's in pain

## 2022-01-10 NOTE — TELEPHONE ENCOUNTER
Pt. 2 weeks PP, reports only getting 5-6oz per day of milk. She has done mothers milk, fenugreek, lactation cookies, and has not seen an increase. She is requesting reglan. Informed I would have to discuss with CLAIRE and call her back. She VU

## 2022-01-11 RX ORDER — METOCLOPRAMIDE 10 MG/1
10 TABLET ORAL 3 TIMES DAILY
Qty: 30 TABLET | Refills: 1 | Status: SHIPPED | OUTPATIENT
Start: 2022-01-11 | End: 2022-01-21

## 2022-01-13 ENCOUNTER — POSTPARTUM VISIT (OUTPATIENT)
Dept: OBSTETRICS AND GYNECOLOGY | Facility: CLINIC | Age: 32
End: 2022-01-13

## 2022-01-13 VITALS
HEIGHT: 67 IN | DIASTOLIC BLOOD PRESSURE: 80 MMHG | BODY MASS INDEX: 26.81 KG/M2 | WEIGHT: 170.8 LBS | SYSTOLIC BLOOD PRESSURE: 120 MMHG

## 2022-01-13 PROCEDURE — 0503F POSTPARTUM CARE VISIT: CPT | Performed by: NURSE PRACTITIONER

## 2022-01-13 NOTE — PROGRESS NOTES
"Chief Complaint   Patient presents with   • Postpartum Care     2 wk visit       2 Week Postpartum Visit         Cooper Bah is a 31 y.o.  s/p , due to Repeat  and probable placental abruption at 38 weeks 3 days on 2021, who presents today for a 2 week postpartum check.      Patient reports her incision is clean, dry, intact. Patient stated that her incision is tender. Patient denies any redness or drainage. Patient denies postpartum depression.  Patient describes bleeding as moderate.  Patient is breast and bottle feeding.  Desires contraceptive methods: None for contraception.  Patient denies bowel or bladder issues.    Postpartum Depression Screening Questionnaire: 3 2022, no treatment indicated.  Baby Name: Jimy  Baby weight: 7 pounds 14.9 ounces  Baby Discharged with Mom      The additional following portions of the patient's history were reviewed and updated as appropriate: allergies and current medications.      Review of Systems   Constitutional: Negative.    Respiratory: Negative.    Cardiovascular: Negative.    Gastrointestinal: Negative.    Genitourinary: Negative.        I have reviewed and agree with the HPI, ROS, and historical information as entered above. Sonia Nicholson, APRN    Objective   /80   Ht 170.2 cm (67\")   Wt 77.5 kg (170 lb 12.8 oz)   Breastfeeding Yes   BMI 26.75 kg/m²     Physical Exam  Constitutional:       Appearance: Normal appearance.   Pulmonary:      Effort: Pulmonary effort is normal.   Abdominal:      Palpations: Abdomen is soft.      Comments: Incision well healed   Neurological:      Mental Status: She is alert.              Assessment and Plan    Problem List Items Addressed This Visit     None      Visit Diagnoses     Postpartum follow-up    -  Primary          1. S/p , 2 weeks postpartum.  Doing well.    2. Continued pelvic rest with a return to driving and light physical activity.  3. Contraception: " contraceptive methods: none for now  Return in about 4 weeks (around 2/10/2022) for JTA 6 weeks.    Sonia Nicholson, APRN  01/13/2022

## 2022-02-12 ENCOUNTER — APPOINTMENT (OUTPATIENT)
Dept: ULTRASOUND IMAGING | Facility: HOSPITAL | Age: 32
End: 2022-02-12

## 2022-02-12 ENCOUNTER — HOSPITAL ENCOUNTER (EMERGENCY)
Facility: HOSPITAL | Age: 32
Discharge: HOME | End: 2022-02-13
Attending: EMERGENCY MEDICINE | Admitting: EMERGENCY MEDICINE

## 2022-02-12 DIAGNOSIS — Z98.891 STATUS POST CESAREAN SECTION: ICD-10-CM

## 2022-02-12 DIAGNOSIS — N93.9 VAGINAL BLEEDING: Primary | ICD-10-CM

## 2022-02-12 LAB
BASOPHILS # BLD AUTO: 0.06 10*3/MM3 (ref 0–0.2)
BASOPHILS NFR BLD AUTO: 0.6 % (ref 0–1.5)
DEPRECATED RDW RBC AUTO: 45.1 FL (ref 37–54)
EOSINOPHIL # BLD AUTO: 0.23 10*3/MM3 (ref 0–0.4)
EOSINOPHIL NFR BLD AUTO: 2.3 % (ref 0.3–6.2)
ERYTHROCYTE [DISTWIDTH] IN BLOOD BY AUTOMATED COUNT: 13.4 % (ref 12.3–15.4)
HCT VFR BLD AUTO: 38.9 % (ref 34–46.6)
HGB BLD-MCNC: 12.5 G/DL (ref 12–15.9)
HOLD SPECIMEN: NORMAL
HOLD SPECIMEN: NORMAL
IMM GRANULOCYTES # BLD AUTO: 0.03 10*3/MM3 (ref 0–0.05)
IMM GRANULOCYTES NFR BLD AUTO: 0.3 % (ref 0–0.5)
LYMPHOCYTES # BLD AUTO: 3.78 10*3/MM3 (ref 0.7–3.1)
LYMPHOCYTES NFR BLD AUTO: 38.2 % (ref 19.6–45.3)
MCH RBC QN AUTO: 29.3 PG (ref 26.6–33)
MCHC RBC AUTO-ENTMCNC: 32.1 G/DL (ref 31.5–35.7)
MCV RBC AUTO: 91.1 FL (ref 79–97)
MONOCYTES # BLD AUTO: 0.53 10*3/MM3 (ref 0.1–0.9)
MONOCYTES NFR BLD AUTO: 5.4 % (ref 5–12)
NEUTROPHILS NFR BLD AUTO: 5.26 10*3/MM3 (ref 1.7–7)
NEUTROPHILS NFR BLD AUTO: 53.2 % (ref 42.7–76)
NRBC BLD AUTO-RTO: 0 /100 WBC (ref 0–0.2)
PLATELET # BLD AUTO: 381 10*3/MM3 (ref 140–450)
PMV BLD AUTO: 9.9 FL (ref 6–12)
RBC # BLD AUTO: 4.27 10*6/MM3 (ref 3.77–5.28)
WBC NRBC COR # BLD: 9.89 10*3/MM3 (ref 3.4–10.8)
WHOLE BLOOD HOLD SPECIMEN: NORMAL
WHOLE BLOOD HOLD SPECIMEN: NORMAL

## 2022-02-12 PROCEDURE — 99283 EMERGENCY DEPT VISIT LOW MDM: CPT

## 2022-02-12 PROCEDURE — 76830 TRANSVAGINAL US NON-OB: CPT

## 2022-02-12 PROCEDURE — 85025 COMPLETE CBC W/AUTO DIFF WBC: CPT | Performed by: EMERGENCY MEDICINE

## 2022-02-12 RX ORDER — SODIUM CHLORIDE 0.9 % (FLUSH) 0.9 %
10 SYRINGE (ML) INJECTION AS NEEDED
Status: DISCONTINUED | OUTPATIENT
Start: 2022-02-12 | End: 2022-02-13 | Stop reason: HOSPADM

## 2022-02-13 VITALS
TEMPERATURE: 97.5 F | WEIGHT: 165 LBS | SYSTOLIC BLOOD PRESSURE: 122 MMHG | OXYGEN SATURATION: 99 % | BODY MASS INDEX: 25.9 KG/M2 | RESPIRATION RATE: 16 BRPM | DIASTOLIC BLOOD PRESSURE: 70 MMHG | HEIGHT: 67 IN | HEART RATE: 61 BPM

## 2022-02-13 LAB — HOLD SPECIMEN: NORMAL

## 2022-02-13 NOTE — ED PROVIDER NOTES
[facility]    EMERGENCY DEPARTMENT ENCOUNTER      Pt Name: Cooper Bah  MRN: [id number]  YOB: 1990  Date of evaluation: 2022  Provider: Moody Fry MD    CHIEF COMPLAINT       Chief Complaint   Patient presents with   • Postpartum Complications         HISTORY OF PRESENT ILLNESS  (Location/Symptom, Timing/Onset, Context/Setting, Quality, Duration, Modifying Factors, Severity.)   Cooper Bah is a 31 y.o. female who presents to the emergency department with 1 day of vaginal bleeding.  Patient states that she began having mild bilateral lower abdominal cramping yesterday with some heavy bleeding that gradually subsided today.  She is not having any ongoing bleeding or abdominal pain at this time.  She denies any associated fever, chills, vomiting, diarrhea.  She denies any associated symptoms.  She is about 6-week status post .      Nursing notes were reviewed.    REVIEW OF SYSTEMS    (2-9 systems for level 4, 10 or more for level 5)   ROS:  General:  No fevers, no chills, no weakness  Cardiovascular:  No chest pain, no palpitations  Respiratory:  No shortness of breath, no cough, no wheezing  Gastrointestinal:  Bilateral lower abdominal cramping  Musculoskeletal:  No muscle pain, no joint pain  Skin:  No rash  Neurologic:  No speech problems, no headache, no extremity numbness, no extremity tingling, no extremity weakness  Psychiatric:  No anxiety  Genitourinary: Vaginal bleeding     Except as noted above the remainder of the review of systems was reviewed and negative.       PAST MEDICAL HISTORY     Past Medical History:   Diagnosis Date   • Acid reflux    • Anxiety     since age 24   • History of 2019 novel coronavirus disease (COVID-19) 2020   • History of endometriosis    • Liver mass     benign mass; followed by Dr. Milton   • PONV (postoperative nausea and vomiting)    • Spitz nevus of forearm, right 2020    malignant; clear margins         SURGICAL  HISTORY       Past Surgical History:   Procedure Laterality Date   •  SECTION     •  SECTION N/A 2021    Procedure:  SECTION REPEAT;  Surgeon: Nico Ward MD;  Location: [facility] LABOR DELIVERY;  Service: Obstetrics/Gynecology;  Laterality: N/A;   • COLONOSCOPY     • DIAGNOSTIC LAPAROSCOPY      endometriosis   • EAR TUBES     • ENDOSCOPY      acid reflux   • ENDOSCOPY N/A 2020    Procedure: ESOPHAGOGASTRODUODENOSCOPY;  Surgeon: Cm Echeverria MD;  Location: [facility] ENDOSCOPY;  Service: Gastroenterology;  Laterality: N/A;  esophageal dilation to 20    • OTHER SURGICAL HISTORY  2020    Patient had a procedure on her lower right arm for an atypical spitz nevus tumor biopsy and removal.          CURRENT MEDICATIONS     No current facility-administered medications for this encounter.    Current Outpatient Medications:   •  citalopram (CeleXA) 20 MG tablet, Take 1 tablet by mouth Daily., Disp: 30 tablet, Rfl: 5  •  ibuprofen (ADVIL,MOTRIN) 600 MG tablet, Take 1 tablet by mouth Every 6 (Six) Hours., Disp: 30 tablet, Rfl: 1  •  Norethindrone Acet-Ethinyl Est (Loestrin 1., ,) 1.5-30 MG-MCG tablet, Take 1 tablet by mouth Daily., Disp: 28 each, Rfl: 11  •  prenatal vitamin (prenatal, CLASSIC, vitamin) tablet, Take  by mouth Daily., Disp: , Rfl:   •  Ventolin  (90 Base) MCG/ACT inhaler, , Disp: , Rfl:     ALLERGIES     Patient has no known allergies.    FAMILY HISTORY       Family History   Problem Relation Age of Onset   • Arthritis Mother    • Asthma Mother    • Cancer Mother    • COPD Mother    • Depression Mother    • Diabetes Mother    • Hyperlipidemia Mother    • Hypertension Mother    • Mental illness Mother    • Miscarriages / Stillbirths Mother    • Colon cancer Mother    • Arthritis Father    • Cancer Father    • Hyperlipidemia Father    • Arthritis Brother    • Asthma Brother    • Diabetes Brother    • Cancer Maternal Aunt    •  "Early death Maternal Aunt    • Cancer Maternal Grandmother    • COPD Maternal Grandmother    • Early death Maternal Grandfather    • Heart disease Maternal Grandfather    • Hyperlipidemia Maternal Grandfather    • Hypertension Maternal Grandfather           SOCIAL HISTORY       Social History     Socioeconomic History   • Marital status:    Tobacco Use   • Smoking status: Current Every Day Smoker     Packs/day: 0.50     Types: Cigarettes   • Smokeless tobacco: Never Used   Vaping Use   • Vaping Use: Former   Substance and Sexual Activity   • Alcohol use: Not Currently     Comment: 3-4/week when not pregnant   • Drug use: Not Currently     Types: Marijuana     Comment: in high school   • Sexual activity: Defer     Birth control/protection: None         PHYSICAL EXAM    (up to 7 for level 4, 8 or more for level 5)     Vitals:    02/12/22 2126 02/13/22 0108   BP: 126/69 122/70   BP Location:  Left arm   Patient Position:  Sitting   Pulse: 80 61   Resp: 12 16   Temp: 97.5 °F (36.4 °C)    SpO2: 100% 99%   Weight: 74.8 kg (165 lb)    Height: 170.2 cm (67\")        Physical Exam  General: Awake, alert, no acute distress.  HEENT: Conjunctivae normal.  Neck: Trachea midline.  Cardiac: Heart regular rate, rhythm, no murmurs, rubs, or gallops  Lungs: Lungs are clear to auscultation, there is no wheezing, rhonchi, or rales. There is no use of accessory muscles.  Chest wall: There is no tenderness to palpation over the chest wall or over ribs  Abdomen: Abdomen is soft, nontender, nondistended. There are no firm or pulsatile masses, no rebound rigidity or guarding.   Musculoskeletal: No deformity.  Neuro: Alert and oriented x 4.  Dermatology: Skin is warm and dry  Psych: Mentation is grossly normal, cognition is grossly normal. Affect is appropriate.        DIAGNOSTIC RESULTS   RADIOLOGY:   Non-plain film images such as CT, Ultrasound and MRI are read by the radiologist. Plain radiographic images are visualized and " preliminarily interpreted by the emergency physician with the below findings:      [x] Radiologist's Report Reviewed:  US Non-ob Transvaginal   Final Result      1.  Echogenic, heterogeneous appearance in the endometrial canal with echogenic foci. Consider foci of air with complex fluid including blood products, retained products of infection, also endometritis. Recommend gynecologic consultation      Electronically signed by:  Fely Carballo M.D.     2/12/2022 10:33 PM Mountain Time            ED BEDSIDE ULTRASOUND:   Performed by ED Physician - none    LABS:    I have reviewed and interpreted all of the currently available lab results from this visit (if applicable):  Results for orders placed or performed during the hospital encounter of 02/12/22   CBC Auto Differential    Specimen: Blood   Result Value Ref Range    WBC 9.89 3.40 - 10.80 10*3/mm3    RBC 4.27 3.77 - 5.28 10*6/mm3    Hemoglobin 12.5 12.0 - 15.9 g/dL    Hematocrit 38.9 34.0 - 46.6 %    MCV 91.1 79.0 - 97.0 fL    MCH 29.3 26.6 - 33.0 pg    MCHC 32.1 31.5 - 35.7 g/dL    RDW 13.4 12.3 - 15.4 %    RDW-SD 45.1 37.0 - 54.0 fl    MPV 9.9 6.0 - 12.0 fL    Platelets 381 140 - 450 10*3/mm3    Neutrophil % 53.2 42.7 - 76.0 %    Lymphocyte % 38.2 19.6 - 45.3 %    Monocyte % 5.4 5.0 - 12.0 %    Eosinophil % 2.3 0.3 - 6.2 %    Basophil % 0.6 0.0 - 1.5 %    Immature Grans % 0.3 0.0 - 0.5 %    Neutrophils, Absolute 5.26 1.70 - 7.00 10*3/mm3    Lymphocytes, Absolute 3.78 (H) 0.70 - 3.10 10*3/mm3    Monocytes, Absolute 0.53 0.10 - 0.90 10*3/mm3    Eosinophils, Absolute 0.23 0.00 - 0.40 10*3/mm3    Basophils, Absolute 0.06 0.00 - 0.20 10*3/mm3    Immature Grans, Absolute 0.03 0.00 - 0.05 10*3/mm3    nRBC 0.0 0.0 - 0.2 /100 WBC   Green Top (Gel)   Result Value Ref Range    Extra Tube Hold for add-ons.    Lavender Top   Result Value Ref Range    Extra Tube hold for add-on    Gold Top - SST   Result Value Ref Range    Extra Tube Hold for add-ons.    Gray Top   Result  "Value Ref Range    Extra Tube Hold for add-ons.    Light Blue Top   Result Value Ref Range    Extra Tube hold for add-on         All other labs were within normal range or not returned as of this dictation.      EMERGENCY DEPARTMENT COURSE and DIFFERENTIAL DIAGNOSIS/MDM:   Vitals:    Vitals:    02/12/22 2126 02/13/22 0108   BP: 126/69 122/70   BP Location:  Left arm   Patient Position:  Sitting   Pulse: 80 61   Resp: 12 16   Temp: 97.5 °F (36.4 °C)    SpO2: 100% 99%   Weight: 74.8 kg (165 lb)    Height: 170.2 cm (67\")        ED Course as of 02/18/22 0227   Sun Feb 13, 2022 0051 Spoke with Jasmin nurse practitioner on call for Dr. Ward [facility] group.  Discussed case, presentation, labs, vital signs, exam, ultrasound findings.  Agrees patient stable for discharge with close follow-up next week. [NS]      ED Course User Index  [NS] Moody Fry MD       Patient well-appearing with no ongoing bleeding during the course of her stay in emergency department.  Ultrasound demonstrates no evidence of acute intrapelvic pathology.  Suspect resumption of menses.  No significant evidence of retained products, mass, or other abnormality.  Patient is not significantly anemic.  She is appropriate for discharge home with close outpatient follow-up with her OB/GYN.    I had a discussion with the patient/family regarding diagnosis, diagnostic results, treatment plan, and medications.  The patient/family indicated understanding of these instructions.  I spent adequate time at the bedside preceding discharge necessary to personally discuss the aftercare instructions, giving patient education, providing explanations of the results of our evaluations/findings, and my decision making to assure that the patient/family understand the plan of care.  Time was allotted to answer questions at that time and throughout the ED course.  Emphasis was placed on timely follow-up after discharge.  I also discussed the potential for the " development of an acute emergent condition requiring further evaluation, admission, or even surgical intervention. I discussed that we found nothing during the visit today indicating the need for further workup, admission, or the presence of an unstable medical condition.  I encouraged the patient to return to the emergency department immediately for ANY concerns, worsening, new complaints, or if symptoms persist and unable to seek follow-up in a timely fashion.  The patient/family expressed understanding and agreement with this plan.  The patient will follow-up with their PCP in 1-2 days for reevaluation.         FINAL IMPRESSION      1. Vaginal bleeding    2. Status post  section          DISPOSITION/PLAN     ED Disposition     ED Disposition Condition Comment    Discharge Stable           PATIENT REFERRED TO:  Marquise Garcia DO  [address]  [facility] [address] [phone]    Schedule an appointment as soon as possible for a visit in 2 days      [facility] Emergency Department  [address] [place] [id number] [phone]    If symptoms worsen    Please follow up with your OBGYN as soon as possible    In 1 day        DISCHARGE MEDICATIONS:     Medication List      CONTINUE taking these medications    ibuprofen 600 MG tablet  Commonly known as: ADVIL,MOTRIN  Take 1 tablet by mouth Every 6 (Six) Hours.     prenatal (CLASSIC) vitamin  tablet  Generic drug: prenatal vitamin     Ventolin  (90 Base) MCG/ACT inhaler  Generic drug: albuterol sulfate HFA                Comment: Please note this report has been produced using speech recognition software.      Moody Fry MD  Attending Emergency Physician               Moody Fry MD  22 [phone]

## 2022-02-16 ENCOUNTER — POSTPARTUM VISIT (OUTPATIENT)
Dept: OBSTETRICS AND GYNECOLOGY | Facility: CLINIC | Age: 32
End: 2022-02-16

## 2022-02-16 VITALS
DIASTOLIC BLOOD PRESSURE: 74 MMHG | SYSTOLIC BLOOD PRESSURE: 122 MMHG | BODY MASS INDEX: 26.46 KG/M2 | WEIGHT: 168.6 LBS | HEIGHT: 67 IN

## 2022-02-16 DIAGNOSIS — Z12.4 SCREENING FOR CERVICAL CANCER: ICD-10-CM

## 2022-02-16 DIAGNOSIS — D62 ANEMIA DUE TO ACUTE BLOOD LOSS: ICD-10-CM

## 2022-02-16 DIAGNOSIS — Z01.419 WOMEN'S ANNUAL ROUTINE GYNECOLOGICAL EXAMINATION: Primary | ICD-10-CM

## 2022-02-16 DIAGNOSIS — O44.40 LOW-LYING PLACENTA: ICD-10-CM

## 2022-02-16 PROBLEM — Z30.011 VISIT FOR ORAL CONTRACEPTIVE PRESCRIPTION: Status: ACTIVE | Noted: 2022-02-16

## 2022-02-16 PROCEDURE — 99213 OFFICE O/P EST LOW 20 MIN: CPT | Performed by: OBSTETRICS & GYNECOLOGY

## 2022-02-16 RX ORDER — NORETHINDRONE ACETATE AND ETHINYL ESTRADIOL .03; 1.5 MG/1; MG/1
1 TABLET ORAL DAILY
Qty: 28 EACH | Refills: 11 | Status: SHIPPED | OUTPATIENT
Start: 2022-02-16 | End: 2022-06-08

## 2022-02-16 RX ORDER — CITALOPRAM 20 MG/1
20 TABLET ORAL DAILY
Qty: 30 TABLET | Refills: 5 | Status: SHIPPED | OUTPATIENT
Start: 2022-02-16 | End: 2023-02-16

## 2022-02-16 NOTE — PROGRESS NOTES
Chief Complaint   Patient presents with   • Postpartum Care       6 Week Postpartum Visit         Cooper Bah is a 31 y.o.  s/p , due to H/o previous  section low transverse; probable placental abruption at 38 weeks on 2021, who presents today for a 6 week postpartum check.  Patient's pregnancy was complicated by a low-lying placenta and placental abruption during trial of labor necessitating emergent repeat .  Patient had postpartum hemorrhage which responded to uterotonic's.  Her hematocrit decreased to 21% and received 3 units of blood with improvement of her symptoms.    At the time of delivery were you diagnosed with any of the following: Post partum hemorrhaging. The incision and is healing well.  Patient states she went to the emergency department on 2022 for the bleeding.  Her bleeding had stopped prior to that point and hematocrit was 38%.  She was told she is likely having her first menses.    Patient reports postpartum depression.  Patient describes bleeding as moderate.  Patient tried to breast-feed for a couple weeks without success.  Patient is bottle feeding.  Desires contraceptive methods: OCP (estrogen/progesterone) for contraception.  Patient denies bowel or bladder issues.    Postpartum Depression Screening Questionnaire: 15, patient was started on Zoloft by PCP for postpartum depression.  She states she feels somewhat zombielike and wishes to try different medication.  Denies any suicidal thoughts or ideation.  Baby Name: Jimy  Baby Weight: 7.15  Baby Discharged: Discharged with Mom  Delivering Physician: Sylvia    Last Completed Pap Smear     This patient has no relevant Health Maintenance data.        Is the patient due for a pap today? No    The additional following portions of the patient's history were reviewed and updated as appropriate: allergies, current medications, past family history, past medical history, past social history, past  "surgical history and problem list.    Review of Systems   Constitutional: Negative for chills, fatigue and fever.   Respiratory: Negative.    Cardiovascular: Negative.    Gastrointestinal: Negative for abdominal distention and abdominal pain.   Genitourinary: Negative.    Neurological: Negative for headache.   Psychiatric/Behavioral: Positive for depressed mood.     All other systems reviewed and are negative.     I have reviewed and agree with the HPI, ROS, and historical information as entered above. Nico Ward MD    /74   Ht 170.2 cm (67\")   Wt 76.5 kg (168 lb 9.6 oz)   BMI 26.41 kg/m²     Physical Exam  Vitals and nursing note reviewed. Exam conducted with a chaperone present.   Constitutional:       Appearance: She is well-developed.   HENT:      Head: Normocephalic and atraumatic.   Pulmonary:      Effort: Pulmonary effort is normal.   Abdominal:      General: A surgical scar is present.      Palpations: Abdomen is soft. Abdomen is not rigid.      Comments: Clean, Dry, and Intact.  No erythema.    Genitourinary:     Vagina: No lesions or prolapsed vaginal walls.      Cervix: No lesion or erythema.      Uterus: Not enlarged, not tender and no uterine prolapse.       Adnexa:         Right: No mass, tenderness or fullness.          Left: No mass, tenderness or fullness.        Comments: No vaginal discharge or bleeding present.  Cervix closed without discharge or bleeding present no cervical motion tenderness.  Uterus normal size.  No adnexal masses or tenderness.  Musculoskeletal:      Cervical back: Normal range of motion.   Neurological:      Mental Status: She is alert and oriented to person, place, and time.   Psychiatric:         Mood and Affect: Mood normal.         Behavior: Behavior normal.             Assessment and Plan    Problem List Items Addressed This Visit     Screening for cervical cancer    Overview     Last Pap smear in September 2020.  Repeat Pap smear 6 weeks " postpartum.  Pap smear done 2022.         Low-lying placenta    Overview     Resolution of anterior placenta previa.          Delivery by  section using transverse incision of lower segment of uterus    Overview     2021; TOLAC with C section for placental abruption. Male infant 7#15.          Anemia due to acute blood loss    Overview     Hematocrit decreased to 20.6 post .  Improved post 3 units of packed red blood cells.           Other Visit Diagnoses     Women's annual routine gynecological examination    -  Primary    Relevant Orders    Pap IG, HPV-hr    Postpartum follow-up              1. S/p , 7 weeks postpartum.  Bottlefeeding.  May have had menses on Friday.  Had heavy bleeding and went to ED.  Bleeding now stopped.  2. Contraceptive options reviewed.  Patient declines Mirena IUD.  Rx Lo Loestrin 1. sent to pharmacy.  3. Postpartum depression.  Patient wishes to switch from Zoloft to Celexa.  Rx Celexa 20 mg sent to pharmacy.  4. History of low-lying placenta and postpartum uterine atony.  Status post 3 units of packed red blood cells.  Recent hematocrit 38 in the ER.  5. Return to normal physical activity.  No pelvic restrictions.  Patient can return to work at [employer] at 10 weeks.  6. Contraception: contraceptive methods: OCP (estrogen/progesterone)  7. Return in about 1 year (around 2023) for Annual physical.     Nico Ward MD  2022

## 2022-02-28 DIAGNOSIS — Z01.419 WOMEN'S ANNUAL ROUTINE GYNECOLOGICAL EXAMINATION: ICD-10-CM

## 2022-06-08 ENCOUNTER — OFFICE VISIT (OUTPATIENT)
Dept: OBSTETRICS AND GYNECOLOGY | Facility: CLINIC | Age: 32
End: 2022-06-08

## 2022-06-08 ENCOUNTER — TELEPHONE (OUTPATIENT)
Dept: OBSTETRICS AND GYNECOLOGY | Facility: CLINIC | Age: 32
End: 2022-06-08

## 2022-06-08 VITALS — BODY MASS INDEX: 26.75 KG/M2 | DIASTOLIC BLOOD PRESSURE: 72 MMHG | WEIGHT: 170.8 LBS | SYSTOLIC BLOOD PRESSURE: 110 MMHG

## 2022-06-08 DIAGNOSIS — R68.82 DECREASED LIBIDO: ICD-10-CM

## 2022-06-08 DIAGNOSIS — Z30.41 ENCOUNTER FOR SURVEILLANCE OF CONTRACEPTIVE PILLS: ICD-10-CM

## 2022-06-08 DIAGNOSIS — N93.9 ABNORMAL UTERINE BLEEDING (AUB): Primary | ICD-10-CM

## 2022-06-08 LAB
ALBUMIN SERPL-MCNC: 4.1 G/DL (ref 3.5–5.2)
ALBUMIN/GLOB SERPL: 1.5 G/DL
ALP SERPL-CCNC: 43 U/L (ref 39–117)
ALT SERPL-CCNC: 21 U/L (ref 1–33)
AST SERPL-CCNC: 18 U/L (ref 1–32)
BILIRUB SERPL-MCNC: 0.8 MG/DL (ref 0–1.2)
BUN SERPL-MCNC: 13 MG/DL (ref 6–20)
BUN/CREAT SERPL: 16.9 (ref 7–25)
CALCIUM SERPL-MCNC: 9 MG/DL (ref 8.6–10.5)
CHLORIDE SERPL-SCNC: 104 MMOL/L (ref 98–107)
CO2 SERPL-SCNC: 24.5 MMOL/L (ref 22–29)
CREAT SERPL-MCNC: 0.77 MG/DL (ref 0.57–1)
EGFRCR SERPLBLD CKD-EPI 2021: 104.6 ML/MIN/1.73
ERYTHROCYTE [DISTWIDTH] IN BLOOD BY AUTOMATED COUNT: 14.3 % (ref 12.3–15.4)
GLOBULIN SER CALC-MCNC: 2.7 GM/DL
GLUCOSE SERPL-MCNC: 85 MG/DL (ref 65–99)
HCG INTACT+B SERPL-ACNC: <0.5 MIU/ML
HCT VFR BLD AUTO: 42.1 % (ref 34–46.6)
HGB BLD-MCNC: 13.9 G/DL (ref 12–15.9)
MCH RBC QN AUTO: 29 PG (ref 26.6–33)
MCHC RBC AUTO-ENTMCNC: 33 G/DL (ref 31.5–35.7)
MCV RBC AUTO: 87.7 FL (ref 79–97)
PLATELET # BLD AUTO: 290 10*3/MM3 (ref 140–450)
POTASSIUM SERPL-SCNC: 4.3 MMOL/L (ref 3.5–5.2)
PROT SERPL-MCNC: 6.8 G/DL (ref 6–8.5)
RBC # BLD AUTO: 4.8 10*6/MM3 (ref 3.77–5.28)
SODIUM SERPL-SCNC: 140 MMOL/L (ref 136–145)
TSH SERPL DL<=0.005 MIU/L-ACNC: 0.63 UIU/ML (ref 0.27–4.2)
WBC # BLD AUTO: 9.96 10*3/MM3 (ref 3.4–10.8)

## 2022-06-08 PROCEDURE — 99214 OFFICE O/P EST MOD 30 MIN: CPT | Performed by: NURSE PRACTITIONER

## 2022-06-08 RX ORDER — NORGESTIMATE AND ETHINYL ESTRADIOL 7DAYSX3 28
1 KIT ORAL DAILY
Qty: 28 TABLET | Refills: 12 | Status: SHIPPED | OUTPATIENT
Start: 2022-06-08 | End: 2023-06-08

## 2022-06-08 NOTE — PROGRESS NOTES
Chief Complaint   Patient presents with   • Discuss issues with OCP   • decreased libido       Subjective   HPI  Cooper Bah is a 32 y.o. female, , who presents to discuss issues with her OCP.    She reports that when she takes active pills, she will bleed.  When she is not taking pills, during placebo week she will not bleed.  This has been occurring since starting OCP after her postpartum appt in Feb.  She states that she is taking it daily, within a two hour window.  Bleeding varies from spotting to moderate without clotting or cramping.      She also c/o issues with decreased libido.        She is unsure the date of her LMP, due to irregular bleeding with OCP.   Patient reports problems with: decreased libido and irregular bleeding.  Partner Status: Marital Status: .  New Partners since last visit: no.  Desires STD Screening: no.    Additional OB/GYN History   Current contraception: contraceptive methods: OCP  Desires to: discuss contraception  Last Pap : 22  Last Completed Pap Smear          Ordered - PAP SMEAR (Every 3 Years) Ordered on 2022  SCANNED - PAP SMEAR              History of abnormal Pap smear: no        OB History        2    Para   2    Term   2       0    AB   0    Living   2       SAB   0    IAB   0    Ectopic   0    Molar   0    Multiple   0    Live Births   2          Obstetric Comments   FOB #1 : Pregnancy #1  FOB #2 : Pregnancy #2             Health Maintenance   Topic Date Due   • ANNUAL PHYSICAL  Never done   • COVID-19 Vaccine (1) Never done   • Pneumococcal Vaccine 0-64 (1 - PCV) Never done   • INFLUENZA VACCINE  2022   • Annual Gynecologic Pelvic and Breast Exam  2023   • PAP SMEAR  2025   • TDAP/TD VACCINES (3 - Td or Tdap) 10/22/2031   • HEPATITIS C SCREENING  Completed       The additional following portions of the patient's history were reviewed and updated as appropriate: allergies, current medications, past  family history, past medical history, past social history, past surgical history and problem list.    Review of Systems   Constitutional: Negative.    HENT: Negative.    Respiratory: Negative.    Cardiovascular: Negative.    Gastrointestinal: Negative.    Genitourinary: Positive for decreased libido.        AUB on OCPs   Musculoskeletal: Negative.    Skin: Negative.    Allergic/Immunologic: Negative.    Neurological: Negative.    Hematological: Negative.    Psychiatric/Behavioral: Negative.        I have reviewed and agree with the HPI, ROS, and historical information as entered above. Jasmin Paez, APRN    Objective   /72   Wt 77.5 kg (170 lb 12.8 oz)   LMP  (LMP Unknown)   Breastfeeding No   BMI 26.75 kg/m²     Physical Exam  Vitals and nursing note reviewed. Exam conducted with a chaperone present.   Constitutional:       Appearance: Normal appearance.   HENT:      Head: Normocephalic and atraumatic.   Pulmonary:      Effort: Pulmonary effort is normal.   Genitourinary:     Labia:         Right: No rash, tenderness or lesion.         Left: No rash, tenderness or lesion.       Vagina: Normal. No lesions.      Cervix: No cervical motion tenderness, discharge, lesion or cervical bleeding.      Uterus: Normal. Not enlarged, not fixed and not tender.       Adnexa:         Right: No mass or tenderness.          Left: No mass or tenderness.        Rectum: No external hemorrhoid.      Comments: Chaperone Present  Neurological:      Mental Status: She is alert and oriented to person, place, and time.   Psychiatric:         Behavior: Behavior normal.         Assessment & Plan     Assessment     Problem List Items Addressed This Visit    None     Visit Diagnoses     Abnormal uterine bleeding (AUB)    -  Primary    on OCP's    Relevant Orders    CBC (No Diff) (Completed)    Comprehensive Metabolic Panel (Completed)    TSH (Completed)    HCG, B-subunit, Quantitative (Completed)    Decreased libido        Relevant  Medications    Flibanserin 100 MG tablet    Other Relevant Orders    CBC (No Diff) (Completed)    Comprehensive Metabolic Panel (Completed)    TSH (Completed)    HCG, B-subunit, Quantitative (Completed)    Encounter for surveillance of contraceptive pills              Plan     1. Bleeding during active pills. Will try a different OCP, Rx Tri-Sprintec. Check labs. Call if no improvement.   2. Decreased libido- Pt would like to try Addyi. We reviewed risks and side effects. Advised to avoid alcohol while taking, and to discontinue if no improvement in 8 weeks.       Jasmin Paez, ZAK  06/08/2022

## 2022-06-08 NOTE — TELEPHONE ENCOUNTER
The PA for Cleopatra came across as last name Bottoms. In Epic her last name is Niurka. The PA was initiated with her medical care and is not covered. I called her pharmacy and gave them her [place] ins and its covered at 144.00

## 2022-06-09 ENCOUNTER — TELEPHONE (OUTPATIENT)
Dept: OBSTETRICS AND GYNECOLOGY | Facility: CLINIC | Age: 32
End: 2022-06-09

## (undated) DEVICE — CONTN GRAD MEAS TRIANG 32OZ BLK

## (undated) DEVICE — COATED VICRYL  (POLYGLACTIN 910) SUTURE, VIOLET BRAIDED, STERILE, SYNTHETIC ABSORBABLE SUTURE: Brand: COATED VICRYL

## (undated) DEVICE — "MH-443 SUCTION VALVE F/EVIS140 EVIS160": Brand: SUCTION VALVE

## (undated) DEVICE — MAT PREVALON MOBL TRANSFR AIR W/PAD 39X80IN

## (undated) DEVICE — SOL IRR NACL 0.9PCT BT 1000ML

## (undated) DEVICE — SUT GUT CHRM 1 CTX 36IN 905H

## (undated) DEVICE — TRY SPINE BLCK WHITACRE 25G 3X5IN

## (undated) DEVICE — PROXIMATE RH ROTATING HEAD SKIN STAPLERS (35 WIDE) CONTAINS 35 STAINLESS STEEL STAPLES: Brand: PROXIMATE

## (undated) DEVICE — Device: Brand: AIR/WATER CHANNEL CLEANING ADAPTER

## (undated) DEVICE — TRAP FLD MINIVAC MEGADYNE 100ML

## (undated) DEVICE — TUBING,OXYGEN,CRUSH RES,7',CLEAR,UC: Brand: MEDLINE INDUSTRIES, INC.

## (undated) DEVICE — DEV INFL ALLIANCE2 SYS

## (undated) DEVICE — SINGLE-USE BIOPSY FORCEPS: Brand: RADIAL JAW 4

## (undated) DEVICE — SUT VIC 2/0 CT1 27IN J339H BX/36

## (undated) DEVICE — SYR LUERLOK 50ML

## (undated) DEVICE — GLV SURG BIOGEL LTX PF 7 1/2

## (undated) DEVICE — 4-PORT MANIFOLD: Brand: NEPTUNE 2

## (undated) DEVICE — ENDOGATOR HYBRID TUBING KIT FOR USE WITH ENDOGATOR IRRIGATION PUMP, OLYMPUS PUMP, GI4000 ESU, AND TORRENT IRRIGATION PUMP.: Brand: ENDOGATOR KIT

## (undated) DEVICE — ESOPHAGEAL WIREGUIDED BALLOON DILATATION CATHETER: Brand: CRE WIREGUIDED

## (undated) DEVICE — PATIENT RETURN ELECTRODE, SINGLE-USE, CONTACT QUALITY MONITORING, ADULT, WITH 9FT CORD, FOR PATIENTS WEIGING OVER 33LBS. (15KG): Brand: MEGADYNE

## (undated) DEVICE — SOL IRR H2O BTL 1000ML STRL

## (undated) DEVICE — "MH-438 A/W VLVE F/140 EVIS-140": Brand: AIR/WATER VALVE

## (undated) DEVICE — THE BITE BLOCK MAXI, LATEX FREE STRAP IS USED TO PROTECT THE ENDOSCOPE INSERTION TUBE FROM BEING BITTEN BY THE PATIENT.

## (undated) DEVICE — STPLR SKIN SUBCUTICULAR INSORB 2030

## (undated) DEVICE — SUT PLAIN  3/0 CT1 27IN 842H

## (undated) DEVICE — PK C/SECT 10